# Patient Record
Sex: FEMALE | Race: BLACK OR AFRICAN AMERICAN | NOT HISPANIC OR LATINO | Employment: OTHER | URBAN - METROPOLITAN AREA
[De-identification: names, ages, dates, MRNs, and addresses within clinical notes are randomized per-mention and may not be internally consistent; named-entity substitution may affect disease eponyms.]

---

## 2023-07-13 ENCOUNTER — OFFICE VISIT (OUTPATIENT)
Dept: UROGYNECOLOGY | Facility: CLINIC | Age: 68
End: 2023-07-13
Payer: MEDICARE

## 2023-07-13 VITALS — SYSTOLIC BLOOD PRESSURE: 144 MMHG | WEIGHT: 192 LBS | DIASTOLIC BLOOD PRESSURE: 86 MMHG | HEART RATE: 67 BPM

## 2023-07-13 DIAGNOSIS — N99.3 COMPLETE PROLAPSE OF VAGINAL VAULT: ICD-10-CM

## 2023-07-13 DIAGNOSIS — R35.0 URINARY FREQUENCY: ICD-10-CM

## 2023-07-13 DIAGNOSIS — N95.2 VAGINAL ATROPHY: ICD-10-CM

## 2023-07-13 DIAGNOSIS — R33.9 INCOMPLETE BLADDER EMPTYING: Primary | ICD-10-CM

## 2023-07-13 PROCEDURE — 99205 OFFICE O/P NEW HI 60 MIN: CPT | Mod: 25,S$GLB,, | Performed by: OBSTETRICS & GYNECOLOGY

## 2023-07-13 PROCEDURE — 3079F DIAST BP 80-89 MM HG: CPT | Mod: CPTII,S$GLB,, | Performed by: OBSTETRICS & GYNECOLOGY

## 2023-07-13 PROCEDURE — 3079F PR MOST RECENT DIASTOLIC BLOOD PRESSURE 80-89 MM HG: ICD-10-PCS | Mod: CPTII,S$GLB,, | Performed by: OBSTETRICS & GYNECOLOGY

## 2023-07-13 PROCEDURE — 99205 PR OFFICE/OUTPT VISIT, NEW, LEVL V, 60-74 MIN: ICD-10-PCS | Mod: 25,S$GLB,, | Performed by: OBSTETRICS & GYNECOLOGY

## 2023-07-13 PROCEDURE — 51701 INSERT BLADDER CATHETER: CPT | Mod: S$GLB,,, | Performed by: OBSTETRICS & GYNECOLOGY

## 2023-07-13 PROCEDURE — 1159F MED LIST DOCD IN RCRD: CPT | Mod: CPTII,S$GLB,, | Performed by: OBSTETRICS & GYNECOLOGY

## 2023-07-13 PROCEDURE — 3077F SYST BP >= 140 MM HG: CPT | Mod: CPTII,S$GLB,, | Performed by: OBSTETRICS & GYNECOLOGY

## 2023-07-13 PROCEDURE — 51701 PR INSERTION OF NON-INDWELLING BLADDER CATHETERIZATION FOR RESIDUAL UR: ICD-10-PCS | Mod: S$GLB,,, | Performed by: OBSTETRICS & GYNECOLOGY

## 2023-07-13 PROCEDURE — 1159F PR MEDICATION LIST DOCUMENTED IN MEDICAL RECORD: ICD-10-PCS | Mod: CPTII,S$GLB,, | Performed by: OBSTETRICS & GYNECOLOGY

## 2023-07-13 PROCEDURE — 3077F PR MOST RECENT SYSTOLIC BLOOD PRESSURE >= 140 MM HG: ICD-10-PCS | Mod: CPTII,S$GLB,, | Performed by: OBSTETRICS & GYNECOLOGY

## 2023-07-13 RX ORDER — ESOMEPRAZOLE MAGNESIUM 40 MG/1
40 CAPSULE, DELAYED RELEASE ORAL
Status: ON HOLD | COMMUNITY
End: 2023-12-22 | Stop reason: HOSPADM

## 2023-07-13 RX ORDER — MELOXICAM 7.5 MG/1
TABLET ORAL
COMMUNITY
Start: 2022-09-22

## 2023-07-13 RX ORDER — POTASSIUM CHLORIDE 750 MG/1
CAPSULE, EXTENDED RELEASE ORAL
COMMUNITY
Start: 2022-09-22

## 2023-07-13 RX ORDER — BENZTROPINE MESYLATE 1 MG/1
TABLET ORAL
Status: ON HOLD | COMMUNITY
Start: 2022-09-22 | End: 2023-12-22 | Stop reason: HOSPADM

## 2023-07-13 RX ORDER — HALOPERIDOL 5 MG/1
TABLET ORAL
COMMUNITY
Start: 2022-09-22

## 2023-07-13 RX ORDER — METOPROLOL TARTRATE 50 MG/1
TABLET ORAL
COMMUNITY
Start: 2022-09-22

## 2023-07-13 RX ORDER — MONTELUKAST SODIUM 10 MG/1
TABLET ORAL
COMMUNITY
Start: 2022-09-22

## 2023-07-14 NOTE — PROGRESS NOTES
"PELVIC MEDICINE AND RECONSTRUCTIVE SURGERY CONSULT NOTE    CHIEF COMPLAINT:  Consultation requested by  regarding pelvic organ prolapse    HISTORY OF PRESENT ILLNESS:   Shavon Hahn is a 68 y.o. female  Patient reports first noticing symptoms 2022.    Her symptoms consist of vaginal bulge.    She reports pelvic pressure, vaginal bulge, or vaginal discomfort.    She denies vaginal bleeding, vaginal discharge, dysuria, hematuria.  She reports urinary frequency, urgency  Patient also denies abdominal or pelvic pain.      She states she first noticed the bulge after the birth of her last baby in 1981.  She had a vaginal hysterectomy soon afterward.  For the past 2 years it has "really been out."    Patient presents with symptoms of a vaginal bulge for 2022.  Size of the bulge:  golf ball  Bulge becoming progressively worse over time: Yes:    Bulge limits physical activity: Yes:    Tried a pessary: Yes:    Tried Kegels: No  Prior Surgery for prolapse: No  Prior Surgery for incontinence: No  Splinting to void/BM: No    Voids during the day: q1-2h  Voids at nighttime: 2  Leakage of urine with coughing or exercise: Yes  Leakage of urine with urgency: Yes  Pad for leakage of urine: Yes   -how often do you change your pad? 2times  Sensation of incomplete emptying: No  Stream intermittent  Hematuria Denies  Kidney stones Denies    Glasses/ounces of water in 1 day: 6 bottles  Cups of coffee/tea/soda in 1 day: 2 coffee  Bowel movements in 1 week: 5  Constipation/straining: Yes  Fecal incontinence:  No  Fecal urgency:  No  Fecal smearing:  No    > 3 urinary infections in the past year: No    Sexually active: No  Pain with sex: N/A  Vaginal dryness: Yes  Loss of urine or stool with sexual activity: N/A    She denies vaginal bleeding, vaginal discharge, dysuria, hematuria,   Patient also denies abdominal or pelvic pain.    Patient denies back injury or back pain  Patient denies lower extremity numbness, tingling      Gyn " Hx:  Patient reports h/o Normal paps; denies h/o Fibroids, denies h/o Endometriosis, denies h/o Ovarian Cysts, denies h/o abnormal paps, denies h/o LEEP/Cryo  Menopause age: h/o hyst   No LMP recorded.   V5  Obstetric complications? Yes  Vaginal births? yes  -use of forceps or vacuum? no     OB History    Para Term  AB Living   5 4 4 0 1 4   SAB IAB Ectopic Multiple Live Births   1 0 0 0 4      # Outcome Date GA Lbr Kin/2nd Weight Sex Delivery Anes PTL Lv   5 SAB    4.082 kg (9 lb) M    FD   4 Term    3.459 kg (7 lb 10 oz) M Vag-Spont   CIELO   3 Term    3.714 kg (8 lb 3 oz) M Vag-Spont   CIELO   2 Term    3.629 kg (8 lb) M Vag-Spont   CIELO   1 Term    2.948 kg (6 lb 8 oz) F Vag-Spont   CIELO        Review of patient's allergies indicates:   Allergen Reactions    Codeine     Esomeprazole magnesium           Current Outpatient Medications:     atorvastatin calcium (ATORVASTATIN ORAL),  0 Refill(s), Start Date: 22 11:49:00 CDT, Disp: , Rfl:     benztropine (COGENTIN) 1 MG tablet,  0 Refill(s), Start Date: 22 11:51:00 CDT, Disp: , Rfl:     esomeprazole (NEXIUM) 40 MG capsule, Take 40 mg by mouth before breakfast., Disp: , Rfl:     haloperidoL (HALDOL) 5 MG tablet,  0 Refill(s), Start Date: 22 11:51:00 CDT, Disp: , Rfl:     meloxicam (MOBIC) 7.5 MG tablet,  0 Refill(s), Start Date: 22 11:51:00 CDT, Disp: , Rfl:     metoprolol tartrate (LOPRESSOR) 50 MG tablet,  0 Refill(s), Start Date: 22 11:50:00 CDT, Disp: , Rfl:     montelukast (SINGULAIR) 10 mg tablet,  0 Refill(s), Start Date: 22 11:51:00 CDT, Disp: , Rfl:     potassium chloride (MICRO-K) 10 MEQ CpSR,  0 Refill(s), Start Date: 22 11:50:00 CDT, Disp: , Rfl:     estradioL (ESTRACE) 0.01 % (0.1 mg/gram) vaginal cream, Use a pea sized amount to the vagina once a night for 14 nights when initiating the medication, then 2-3 times per week.  DO NOT use the applicator, Disp: 42.5 g, Rfl:  10    History reviewed. No pertinent past medical history.    Past Surgical History:   Procedure Laterality Date    THROMBECTOMY  2020    VAGINAL HYSTERECTOMY  1981       History reviewed. No pertinent family history.     Social History     Tobacco Use    Smoking status: Never    Smokeless tobacco: Never   Substance Use Topics    Alcohol use: Never    Drug use: Never        Review of Systems      Psychological ROS: negative  Eyes: Trouble seeing near or far  ENT ROS: Painful or difficulty swallowing  Neuro ROS: Difficulty speaking and Unsteady gait  Respiratory ROS: Shortness of breath  Gastrointestinal ROS: Negative  Cardiovascular ROS: Difficulty breathing at night  Genitourinary ROS: Frequent urination, Leaking of urine, and Waking to urinate  Integumentary ROS: Negative  Musculoskeletal ROS: Negative    Physical Exam:   BP (!) 144/86   Pulse 67   Wt 87.1 kg (192 lb)   General: No acute distress   Skin: no lesions  Musculoskeletal: normal lower extremity strength  Sensation to light touch in the lower extremities is normal   Extremities: well perfused with normal pulses in the distal extremities, no peripheral edema noted  Abdominal exam: soft non tender, no palpable masses  External genitalia: normal female genitalia, no lesions, normal female hair distribution, no clitoral enlargement, nontender, no scars  The perineal reflexes are present  Exam Chaperoned by: Henrietta Rizo MA  Vagina/cervix: atrophic vagina, no discharge, exudate, lesion, or erythema  Bimanual exam: Surgically absent, no palpable masses, non-tender exam  Urethra: no prolapse, caruncle absent  Rectal: Medium tone, Poor squeeze, no palpable masses, No stool in the vault, No blood on the glove  Stress test: Negative  Pelvic strength 0/5  Post void residual volume: 320 mL via straight cath    POPQ (Date 2023): Aa +3 Ba +8.5  C +8.5  GH 5.5 PB 3 TVL 9 Ap +2 Bp +3 D N/A        ASSESSMENT:  Shavon Hahn is a 68 y.o.    1. Incomplete  bladder emptying    2. Urinary frequency    3. Complete prolapse of vaginal vault    4. Vaginal atrophy          PLAN:  The pathophysiology of the above condition has been discussed with the patient who expressed understanding.   We discussed the differences between stress and urge incontinence.    Pelvic Organ Prolapse - The patient was counseled regarding the pathophysiology of Prolapse. The patient was counseled regarding the possible natural progression of prolapse and the clinical consequences of worsening prolapse. We discussed that in most cases prolapse is not harmful but can negatively affect your quality of life.  She was counseled regarding management strategies including expectant management, pelvic floor physical therapy, Kegel's, avoidance of constipation and heavy lifting and pessary placement. Surgical management with and without hysterectomy also reviewed. Cure from surgical intervention is not guaranteed and there is risk of occult stress incontinence. She verbalized understanding and desires to consider her decision.  She is deciding between a pessary and surgery.     Incomplete bladder emptying - patient with 320 cc PVR.  Urethral kinking secondary to prolapse.  Will order renal U/S.  Patient will decide between surgery and pessary placement to allow for proper drainage of the bladder.    Orders Placed This Encounter    Urine culture    US Retroperitoneal Complete         Follow up 2 weeks with phone visit.    PATRICK Gallardo MD  Pelvic Medicine and Reconstructive Surgery  Urogynecology  [facility]    60 minutes was spent face to face with patient >50% of the time was spent in counseling and coordination of care.

## 2023-07-18 DIAGNOSIS — N95.8 GENITOURINARY SYNDROME OF MENOPAUSE: Primary | ICD-10-CM

## 2023-07-18 RX ORDER — ESTRADIOL 0.1 MG/G
CREAM VAGINAL
Qty: 42.5 G | Refills: 10 | Status: SHIPPED | OUTPATIENT
Start: 2023-07-18 | End: 2024-02-16 | Stop reason: SDUPTHER

## 2023-07-26 ENCOUNTER — HOSPITAL ENCOUNTER (OUTPATIENT)
Dept: RADIOLOGY | Facility: HOSPITAL | Age: 68
Discharge: HOME | End: 2023-07-26
Attending: OBSTETRICS & GYNECOLOGY
Payer: MEDICARE

## 2023-07-26 DIAGNOSIS — R33.9 INCOMPLETE BLADDER EMPTYING: ICD-10-CM

## 2023-07-26 PROCEDURE — 76770 US EXAM ABDO BACK WALL COMP: CPT | Mod: TC

## 2023-08-04 ENCOUNTER — OFFICE VISIT (OUTPATIENT)
Dept: UROGYNECOLOGY | Facility: CLINIC | Age: 68
End: 2023-08-04
Payer: MEDICARE

## 2023-08-04 DIAGNOSIS — N81.9 VAGINAL VAULT PROLAPSE: ICD-10-CM

## 2023-08-04 DIAGNOSIS — R35.0 URINARY FREQUENCY: ICD-10-CM

## 2023-08-04 DIAGNOSIS — N95.8 GENITOURINARY SYNDROME OF MENOPAUSE: Primary | ICD-10-CM

## 2023-08-04 DIAGNOSIS — R33.9 INCOMPLETE BLADDER EMPTYING: ICD-10-CM

## 2023-08-04 PROCEDURE — 99214 OFFICE O/P EST MOD 30 MIN: CPT | Mod: S$GLB,,, | Performed by: OBSTETRICS & GYNECOLOGY

## 2023-08-04 PROCEDURE — 99214 PR OFFICE/OUTPT VISIT, EST, LEVL IV, 30-39 MIN: ICD-10-PCS | Mod: S$GLB,,, | Performed by: OBSTETRICS & GYNECOLOGY

## 2023-08-04 NOTE — PROGRESS NOTES
"PELVIC MEDICINE AND RECONSTRUCTIVE SURGERY CONSULT NOTE    CHIEF COMPLAINT:  Follow up regarding POP    HISTORY OF PRESENT ILLNESS:   Shavon Hahn is a 68 y.o. female  Patient reports first noticing symptoms .    Her symptoms consist of vaginal bulge.    She reports pelvic pressure, vaginal bulge, or vaginal discomfort.    She denies vaginal bleeding, vaginal discharge, dysuria, hematuria.  She reports urinary frequency, urgency  Patient also denies abdominal or pelvic pain.      She states she first noticed the bulge after the birth of her last baby in .  She had a vaginal hysterectomy soon afterward.  For the past 2 years it has "really been out."    Patient presents with symptoms of a vaginal bulge for .  Size of the bulge:  golf ball  Bulge becoming progressively worse over time: Yes:    Bulge limits physical activity: Yes:    Tried a pessary: Yes:    Tried Kegels: No  Prior Surgery for prolapse: No  Prior Surgery for incontinence: No  Splinting to void/BM: No    Voids during the day: q1-2h  Voids at nighttime: 2  Leakage of urine with coughing or exercise: Yes  Leakage of urine with urgency: Yes  Pad for leakage of urine: Yes   -how often do you change your pad? 2 times  Sensation of incomplete emptying: No  Stream intermittent  Hematuria Denies  Kidney stones Denies    Interim Hx:  Patient presents in her normal state of health.  She would like to discuss surgical options at this time.    Gyn Hx:  Patient reports h/o Normal paps; denies h/o Fibroids, denies h/o Endometriosis, denies h/o Ovarian Cysts, denies h/o abnormal paps, denies h/o LEEP/Cryo  Menopause age: h/o hyst   No LMP recorded.   V5  Obstetric complications? Yes  Vaginal births? yes  -use of forceps or vacuum? no     OB History    Para Term  AB Living   5 4 4 0 1 4   SAB IAB Ectopic Multiple Live Births   1 0 0 0 4      # Outcome Date GA Lbr Kin/2nd Weight Sex Delivery Anes PTL Lv   5 SAB    4.082 kg (9 lb) M  "   FD   4 Term 1979   3.459 kg (7 lb 10 oz) M Vag-Spont   CIELO   3 Term 1978   3.714 kg (8 lb 3 oz) M Vag-Spont   CIELO   2 Term 1976   3.629 kg (8 lb) M Vag-Spont   CIELO   1 Term 1975   2.948 kg (6 lb 8 oz) F Vag-Spont   CIELO        Review of patient's allergies indicates:   Allergen Reactions    Codeine     Esomeprazole magnesium           Current Outpatient Medications:     atorvastatin calcium (ATORVASTATIN ORAL),  0 Refill(s), Start Date: 09/22/22 11:49:00 CDT, Disp: , Rfl:     benztropine (COGENTIN) 1 MG tablet,  0 Refill(s), Start Date: 09/22/22 11:51:00 CDT, Disp: , Rfl:     esomeprazole (NEXIUM) 40 MG capsule, Take 40 mg by mouth before breakfast., Disp: , Rfl:     estradioL (ESTRACE) 0.01 % (0.1 mg/gram) vaginal cream, Use a pea sized amount to the vagina once a night for 14 nights when initiating the medication, then 2-3 times per week.  DO NOT use the applicator, Disp: 42.5 g, Rfl: 10    haloperidoL (HALDOL) 5 MG tablet,  0 Refill(s), Start Date: 09/22/22 11:51:00 CDT, Disp: , Rfl:     meloxicam (MOBIC) 7.5 MG tablet,  0 Refill(s), Start Date: 09/22/22 11:51:00 CDT, Disp: , Rfl:     metoprolol tartrate (LOPRESSOR) 50 MG tablet,  0 Refill(s), Start Date: 09/22/22 11:50:00 CDT, Disp: , Rfl:     montelukast (SINGULAIR) 10 mg tablet,  0 Refill(s), Start Date: 09/22/22 11:51:00 CDT, Disp: , Rfl:     potassium chloride (MICRO-K) 10 MEQ CpSR,  0 Refill(s), Start Date: 09/22/22 11:50:00 CDT, Disp: , Rfl:     No past medical history on file.    Past Surgical History:   Procedure Laterality Date    THROMBECTOMY  2020    VAGINAL HYSTERECTOMY  1981       No family history on file.     Social History     Tobacco Use    Smoking status: Never    Smokeless tobacco: Never   Substance Use Topics    Alcohol use: Never    Drug use: Never        Review of Systems      Psychological ROS: negative  Eyes: Trouble seeing near or far  ENT ROS: Painful or difficulty swallowing  Neuro ROS: Difficulty speaking and Unsteady  gait  Respiratory ROS: Shortness of breath  Gastrointestinal ROS: Negative  Cardiovascular ROS: Difficulty breathing at night  Genitourinary ROS: Frequent urination, Leaking of urine, and Waking to urinate  Integumentary ROS: Negative  Musculoskeletal ROS: Negative    Physical Exam:   There were no vitals taken for this visit.  General: No acute distress   Skin: no lesions  Musculoskeletal: normal lower extremity strength  Sensation to light touch in the lower extremities is normal   Extremities: well perfused with normal pulses in the distal extremities, no peripheral edema noted  Abdominal exam: soft non tender, no palpable masses  External genitalia: normal female genitalia, no lesions, normal female hair distribution, no clitoral enlargement, nontender, no scars  The perineal reflexes are present  Exam Chaperoned by: Henrietta Rizo MA  Vagina/cervix: atrophic vagina, no discharge, exudate, lesion, or erythema  Bimanual exam: Surgically absent, no palpable masses, non-tender exam  Urethra: no prolapse, caruncle absent  Rectal: Medium tone, Poor squeeze, no palpable masses, No stool in the vault, No blood on the glove  Stress test: Negative  Pelvic strength 0/5  Post void residual volume: 320 mL via straight cath    POPQ (Date 2023): Aa +3 Ba +8.5  C +8.5  GH 5.5 PB 3 TVL 9 Ap +2 Bp +3 D N/A          ASSESSMENT:  Shavon Hahn is a 68 y.o.    1. Genitourinary syndrome of menopause    2. Incomplete bladder emptying    3. Urinary frequency    4. Vaginal vault prolapse         PLAN:  The pathophysiology of the above condition has been discussed with the patient who expressed understanding.   We discussed the differences between stress and urge incontinence.    Pelvic Organ Prolapse - The patient was counseled regarding the pathophysiology of Prolapse. The patient was counseled regarding the possible natural progression of prolapse and the clinical consequences of worsening prolapse. We discussed that in most  cases prolapse is not harmful but can negatively affect your quality of life.  She was counseled regarding management strategies including expectant management, pelvic floor physical therapy, Kegel's, avoidance of constipation and heavy lifting and pessary placement. Surgical management reviewed. Cure from surgical intervention is not guaranteed and there is risk of occult stress incontinence. Patient has baseline mixed incontinence, however, she did not demonstrate leak on previous exam and would need to for intervention.  Recommend SSLS, A/P, Bulking.  Patient will need UDT with prolapse reduced before surgery.    Patient counseled on the risks, benefits, and alternatives to surgery, including:  Bleeding, infection, damage to surrounding organs, including the bowel and bladder.  Patient counseled on the risk of injury to major vascular structures.  Patient counseled on the risk of conversion to open, and/or other procedures as necessary.  Patient consents to the use of blood products if it is to save her life.  Patient accepts these risks and wishes to proceed with surgery.    Incomplete bladder emptying - patient with 320 cc PVR.  Urethral kinking secondary to prolapse.  Will order renal U/S.  Patient will decide between surgery and pessary placement to allow for proper drainage of the bladder.  -Renal US normal.  Patient with cholelithiasis and a small left renal cyst.  No hydronephrosis.    Follow up with UDT    PATRICK Gallardo MD  Pelvic Medicine and Reconstructive Surgery  Urogynecology  [facility]    30 minutes was spent face to face with patient >50% of the time was spent in counseling and coordination of care.

## 2023-10-31 ENCOUNTER — OFFICE VISIT (OUTPATIENT)
Dept: UROGYNECOLOGY | Facility: CLINIC | Age: 68
End: 2023-10-31
Payer: MEDICARE

## 2023-10-31 DIAGNOSIS — R35.0 URINARY FREQUENCY: ICD-10-CM

## 2023-10-31 DIAGNOSIS — N81.9: ICD-10-CM

## 2023-10-31 DIAGNOSIS — N95.8 GENITOURINARY SYNDROME OF MENOPAUSE: ICD-10-CM

## 2023-10-31 DIAGNOSIS — R33.9 INCOMPLETE BLADDER EMPTYING: ICD-10-CM

## 2023-10-31 DIAGNOSIS — N99.3 COMPLETE PROLAPSE OF VAGINAL VAULT: Primary | ICD-10-CM

## 2023-10-31 DIAGNOSIS — N39.490: ICD-10-CM

## 2023-10-31 PROCEDURE — 99417 PROLNG OP E/M EACH 15 MIN: CPT | Mod: S$GLB,,, | Performed by: OBSTETRICS & GYNECOLOGY

## 2023-10-31 PROCEDURE — 51728 PR COMPLEX CYSTOMETROGRAM VOIDING PRESSURE STUDIES: ICD-10-PCS | Mod: 26,,, | Performed by: OBSTETRICS & GYNECOLOGY

## 2023-10-31 PROCEDURE — 51728 CYSTOMETROGRAM W/VP: CPT | Mod: 26,,, | Performed by: OBSTETRICS & GYNECOLOGY

## 2023-10-31 PROCEDURE — 99417 PR PROLONGED SVC, OUTPT, W/WO DIRECT PT CONTACT,  EA ADDTL 15 MIN: ICD-10-PCS | Mod: ,,, | Performed by: OBSTETRICS & GYNECOLOGY

## 2023-10-31 PROCEDURE — 99215 PR OFFICE/OUTPT VISIT, EST, LEVL V, 40-54 MIN: ICD-10-PCS | Mod: 25,,, | Performed by: OBSTETRICS & GYNECOLOGY

## 2023-10-31 PROCEDURE — 51784 ANAL/URINARY MUSCLE STUDY: CPT | Mod: 51,S$GLB,, | Performed by: OBSTETRICS & GYNECOLOGY

## 2023-10-31 PROCEDURE — 51741 ELECTRO-UROFLOWMETRY FIRST: CPT | Mod: 51,S$GLB,, | Performed by: OBSTETRICS & GYNECOLOGY

## 2023-10-31 PROCEDURE — 51784 PR ANAL/URINARY MUSCLE STUDY: ICD-10-PCS | Mod: 51,S$GLB,, | Performed by: OBSTETRICS & GYNECOLOGY

## 2023-10-31 PROCEDURE — 51741 PR UROFLOWMETRY, COMPLEX: ICD-10-PCS | Mod: 51,S$GLB,, | Performed by: OBSTETRICS & GYNECOLOGY

## 2023-10-31 PROCEDURE — 51797 PR VOIDING PRESS STUDY INTRA-ABDOMINAL VOID: ICD-10-PCS | Mod: S$GLB,,, | Performed by: OBSTETRICS & GYNECOLOGY

## 2023-10-31 PROCEDURE — 51797 INTRAABDOMINAL PRESSURE TEST: CPT | Mod: S$GLB,,, | Performed by: OBSTETRICS & GYNECOLOGY

## 2023-10-31 PROCEDURE — 99215 OFFICE O/P EST HI 40 MIN: CPT | Mod: 25,,, | Performed by: OBSTETRICS & GYNECOLOGY

## 2023-10-31 NOTE — PROGRESS NOTES
"PELVIC MEDICINE AND RECONSTRUCTIVE SURGERY CONSULT NOTE    CHIEF COMPLAINT:  Follow up regarding POP    HISTORY OF PRESENT ILLNESS:   Shavon Hahn is a 68 y.o. female  Patient reports first noticing symptoms .    Her symptoms consist of vaginal bulge.    She reports pelvic pressure, vaginal bulge, or vaginal discomfort.    She denies vaginal bleeding, vaginal discharge, dysuria, hematuria.  She reports urinary frequency, urgency  Patient also denies abdominal or pelvic pain.      She states she first noticed the bulge after the birth of her last baby in .  She had a vaginal hysterectomy soon afterward.  For the past 2 years it has "really been out."    Patient presents with symptoms of a vaginal bulge for .  Size of the bulge:  golf ball  Bulge becoming progressively worse over time: Yes:    Bulge limits physical activity: Yes:    Tried a pessary: Yes:    Tried Kegels: No  Prior Surgery for prolapse: No  Prior Surgery for incontinence: No  Splinting to void/BM: No    Voids during the day: q1-2h  Voids at nighttime: 2  Leakage of urine with coughing or exercise: Yes  Leakage of urine with urgency: Yes  Pad for leakage of urine: Yes   -how often do you change your pad? 2 times  Sensation of incomplete emptying: No  Stream intermittent  Hematuria Denies  Kidney stones Denies    Interim Hx:  Patient presents in her normal state of health.  She presents for urodynamic testing and would like some relief until surgery. She is open to doing a pessary fitting today.    Gyn Hx:  Patient reports h/o Normal paps; denies h/o Fibroids, denies h/o Endometriosis, denies h/o Ovarian Cysts, denies h/o abnormal paps, denies h/o LEEP/Cryo  Menopause age: h/o hyst   No LMP recorded.   V5  Obstetric complications? Yes  Vaginal births? yes  -use of forceps or vacuum? no     OB History    Para Term  AB Living   5 4 4 0 1 4   SAB IAB Ectopic Multiple Live Births   1 0 0 0 4      # Outcome Date GA Lbr " Kin/2nd Weight Sex Delivery Anes PTL Lv   5 SAB 1981   4.082 kg (9 lb) M    FD   4 Term 1979   3.459 kg (7 lb 10 oz) M Vag-Spont   CIELO   3 Term 1978   3.714 kg (8 lb 3 oz) M Vag-Spont   CIELO   2 Term 1976   3.629 kg (8 lb) M Vag-Spont   CIELO   1 Term 1975   2.948 kg (6 lb 8 oz) F Vag-Spont   CIELO        Review of patient's allergies indicates:   Allergen Reactions    Codeine     Esomeprazole magnesium           Current Outpatient Medications:     atorvastatin calcium (ATORVASTATIN ORAL),  0 Refill(s), Start Date: 09/22/22 11:49:00 CDT, Disp: , Rfl:     benztropine (COGENTIN) 1 MG tablet,  0 Refill(s), Start Date: 09/22/22 11:51:00 CDT, Disp: , Rfl:     esomeprazole (NEXIUM) 40 MG capsule, Take 40 mg by mouth before breakfast., Disp: , Rfl:     estradioL (ESTRACE) 0.01 % (0.1 mg/gram) vaginal cream, Use a pea sized amount to the vagina once a night for 14 nights when initiating the medication, then 2-3 times per week.  DO NOT use the applicator, Disp: 42.5 g, Rfl: 10    haloperidoL (HALDOL) 5 MG tablet,  0 Refill(s), Start Date: 09/22/22 11:51:00 CDT, Disp: , Rfl:     meloxicam (MOBIC) 7.5 MG tablet,  0 Refill(s), Start Date: 09/22/22 11:51:00 CDT, Disp: , Rfl:     metoprolol tartrate (LOPRESSOR) 50 MG tablet,  0 Refill(s), Start Date: 09/22/22 11:50:00 CDT, Disp: , Rfl:     montelukast (SINGULAIR) 10 mg tablet,  0 Refill(s), Start Date: 09/22/22 11:51:00 CDT, Disp: , Rfl:     potassium chloride (MICRO-K) 10 MEQ CpSR,  0 Refill(s), Start Date: 09/22/22 11:50:00 CDT, Disp: , Rfl:     Past Medical History:   Diagnosis Date    Essential (primary) hypertension     GERD without esophagitis     Hypercholesterolemia     Schizoaffective disorder        Past Surgical History:   Procedure Laterality Date    THROMBECTOMY  2020    VAGINAL HYSTERECTOMY  1981       No family history on file.     Social History     Tobacco Use    Smoking status: Never    Smokeless tobacco: Never   Substance Use Topics    Alcohol use: Never    Drug  use: Never        Review of Systems      Psychological ROS: negative  Eyes: Trouble seeing near or far  ENT ROS: Painful or difficulty swallowing  Neuro ROS: Difficulty speaking and Unsteady gait  Respiratory ROS: Shortness of breath  Gastrointestinal ROS: Negative  Cardiovascular ROS: Difficulty breathing at night  Genitourinary ROS: Frequent urination, Leaking of urine, and Waking to urinate  Integumentary ROS: Negative  Musculoskeletal ROS: Negative    Physical Exam:   There were no vitals taken for this visit.  General: No acute distress   Skin: no lesions  Musculoskeletal: normal lower extremity strength  Sensation to light touch in the lower extremities is normal   Extremities: well perfused with normal pulses in the distal extremities, no peripheral edema noted  Abdominal exam: soft non tender, no palpable masses  External genitalia: normal female genitalia, no lesions, normal female hair distribution, no clitoral enlargement, nontender, no scars  The perineal reflexes are present  Exam Chaperoned by: Henrietta Rizo MA  Vagina/cervix: atrophic vagina, no discharge, exudate, lesion, or erythema, laceration in the middle of the anterior vaginal wall epithelium (cracked) from pressure  Bimanual exam: Surgically absent, no palpable masses, non-tender exam  Urethra: no prolapse, caruncle absent  Rectal: Medium tone, Poor squeeze, no palpable masses, No stool in the vault, No blood on the glove  Stress test: Negative  Pelvic strength 0/5  Post void residual volume: 320 mL via straight cath    POPQ (Date 2023): Aa +3 Ba +8.5  C +8.5  GH 5.5 PB 3 TVL 9 Ap +2 Bp +3 D N/A      Pessary fitting:  Tried cube, Flexishelf, Gellhorn, Shaatz, and double pessary. All came out.  Will try to move up surgery at the [facility] from 24 to a sooner date    ASSESSMENT:  Shavon Hahn is a 68 y.o.    1. Complete prolapse of vaginal vault    2. Incomplete bladder emptying    3. Urinary frequency    4. Overflow incontinence of  urine due to prolapse of female genital organ    5. Genitourinary syndrome of menopause           PLAN:  The pathophysiology of the above condition has been discussed with the patient who expressed understanding.   We discussed the differences between stress and urge incontinence.    Pelvic Organ Prolapse - The patient was counseled regarding the pathophysiology of Prolapse. The patient was counseled regarding the possible natural progression of prolapse and the clinical consequences of worsening prolapse. We discussed that in most cases prolapse is not harmful but can negatively affect your quality of life.  She was counseled regarding management strategies including expectant management, pelvic floor physical therapy, Kegel's, avoidance of constipation and heavy lifting and pessary placement. Surgical management reviewed. Cure from surgical intervention is not guaranteed and there is risk of occult stress incontinence. Patient has baseline mixed incontinence, however, she did not demonstrate leak on previous exam and would need to for intervention.  Recommend SSLS, A/P, Bulking.  Patient will need UDT with prolapse reduced before surgery.  -Pessary fitting done today, all pessaries fell out  -Patient scheduled for SSLS, A/P, poss Bulking on 11/21/2023    Overflow incontinence - No leak on testing today.  Incontinence likely represents overflow. Increased bladder capacity and decreased sensation. Advised timed voids. Will bulk if needed (i.e. if she leaks once the prolapse is reduced). Cystoscopy possible.    Incomplete bladder emptying - patient with 320 cc PVR.  Urethral kinking secondary to prolapse.  Will order renal U/S.  Patient will decide between surgery and pessary placement to allow for proper drainage of the bladder.  -Renal US normal.  Patient with cholelithiasis and a small left renal cyst.  No hydronephrosis.    Genitourinary syndrome of menopause/Vaginal atrophy - due to a hypoestrogenic state in the  vagina. Though it is most common in postmenopausal women (up to 50-70%  of postmenopausal women being symptomatic at least to some degree), it can affect up to 15% of premenopausal women was well.  GSM is a spectrum of changes such as vaginal dryness, dyspareunia, irritation, burning, itching, dysuria, urgency, stress/urgency incontinence, recurrent UTIs, urethral prolapse, decreased elasticity, and ischemia of the vesical trigone. If unable to use hormonal treatments, patient can use vaginal moisturizers such as: Replens, Revaree, or Womaness Davily V Soothe. However, if there is no contraindication to use, vaginal estrogen is the best treatment for GSM. Encouraged use of vaginal estrogen as it will restore some elasticity to the vagina and has been shown to decrease the rate of recurrent UTIs, decrease vaginal pH, and increase vaginal lactobacillus.  A pea-sized amount to the vagina every night for 14 days, then 2-3 times per week.  Handout given on low dose vaginal estrogen.    Follow up with skylar Gallardo MD  Pelvic Medicine and Reconstructive Surgery  Urogynecology  [facility]    90 minutes was spent face to face with patient >50% of the time was spent in counseling and coordination of care.

## 2023-11-08 NOTE — PROCEDURES
URODYNAMICS REPORT    Multichannel Urodynamic Testing:       Indication: Mixed urinary incontinence       Uroflow:  Patient voided before she came to testing  Cath PVR (ml): 350 mL         Cystometry:  Prolapse stage: 4  POP reduced: Yes  Fill rate (ml/min): 50  First sensation (ml): 100  First urge (ml): 256  Strong urge (ml): 482  Cystometric capacity (ml): 629  Detrusor contraction Neg  Detrusor contraction leak: Neg Suppressible? N/A  Urodynamic stress incontinence (pos/neg/ml): Neg  Potential stress incontinence (pos/neg/ml): Positive       Leak Point Pressure:  Bladder volume (ml): No leak  Patient position (supine/sitting/standing): Sitting  Valsalva LPP (pos/neg/cmH2O): Neg  Cough LPP (pos/neg/cmH2O): Neg       Pressure Flow Study:  Voided volume (ml): 725.3  Max Flow Rate (ml/s): 37.1  Detrusor void (yes/no): Yes  Pattern: Normal (bell-shaped curve)  Pdet at Peak Flow (cmH2O): 135.3  PVR (ml): 10 mL     Impression:  - Stable bladder, no detrusor overactivity  - Decreased sensation  - Increased capacity (> 300mL), Normal to increased capacity -> 735 mL  - No evidence of urinary stress incontinence or ISD (range < 57irF8V)  - No evidence of voiding dysfunction  - Normal flow pattern     Overflow incontinence is the most likely diagnosis based on multichannel urodynamics:  Patient is able to hold a large amount of urine in her bladder.  Most likely she is leaking when she reaches the upper limits of her storage capacity. Recommend timed voids q3 hours for avoid overfilling.    Patient is not candidate for anti-incontinence procedure.  Results d/w patient briefly.  She will need further counseling on her results and her options.  Discussed that urodynamic testing is not 100% accurate and that occult CATRACHITA still may develop after surgery.  If this happens, we can offer her therapy for incontinence in a staged fashion.    PATRICK Gallardo MD  Pelvic Medicine and Reconstructive Surgery  Urogynecology  [facility]  [place]

## 2023-11-18 ENCOUNTER — HOSPITAL ENCOUNTER (OUTPATIENT)
Dept: TELEMEDICINE | Facility: HOSPITAL | Age: 68
Discharge: HOME | End: 2023-11-18
Payer: MEDICARE

## 2023-11-18 DIAGNOSIS — R47.1 DYSARTHRIA AND ANARTHRIA: Primary | ICD-10-CM

## 2023-11-18 PROCEDURE — 99448 NTRPROF PH1/NTRNET/EHR 21-30: CPT | Mod: ,,, | Performed by: PSYCHIATRY & NEUROLOGY

## 2023-11-18 PROCEDURE — 99448 PR INTERPROF, PHONE/INTERNET/EHR, CONSULT, 21-30 MINS: ICD-10-PCS | Mod: ,,, | Performed by: PSYCHIATRY & NEUROLOGY

## 2023-11-19 NOTE — TELEMEDICINE CONSULT
[facility] [address]  Vascular Neurology  Com[facility] St[facility]  TeleVascular Neurology Interprofessional Consult Note           Consulting Provider: ANDRES HUFFMAN   Patient Location: [facility]     Summary of patient's symptoms:  Moderate dysarthria     Images personally reviewed and interpreted:  Study: not shared (negative per ED physician)    Assessment and plan:  No contraindications to TNK - recommend treatment with thrombolysis followed by CTA head/neck.     I spent approximately 25 minutes on this encounter. More than half of that time was spent communicating with the consulting provider and coordinating patient care.     Signature  April Kaplan MD        This encounter was conducted as an interprofessional communication between providers at the spoke and vascular neurologist. The interaction was completed over the phone or via secure messaging (electronic medical record - [employer] Secure Chat).     Once this note was completed, a written copy was sent back to the provider via fax or electronic medical record.

## 2023-12-12 ENCOUNTER — OFFICE VISIT (OUTPATIENT)
Dept: UROGYNECOLOGY | Facility: CLINIC | Age: 68
End: 2023-12-12
Payer: MEDICARE

## 2023-12-12 VITALS — WEIGHT: 192 LBS | DIASTOLIC BLOOD PRESSURE: 84 MMHG | HEART RATE: 78 BPM | SYSTOLIC BLOOD PRESSURE: 125 MMHG

## 2023-12-12 DIAGNOSIS — R33.9 INCOMPLETE BLADDER EMPTYING: ICD-10-CM

## 2023-12-12 DIAGNOSIS — N99.3 COMPLETE PROLAPSE OF VAGINAL VAULT: Primary | ICD-10-CM

## 2023-12-12 DIAGNOSIS — R35.0 URINARY FREQUENCY: ICD-10-CM

## 2023-12-12 DIAGNOSIS — N95.8 GENITOURINARY SYNDROME OF MENOPAUSE: ICD-10-CM

## 2023-12-12 DIAGNOSIS — N81.9: ICD-10-CM

## 2023-12-12 DIAGNOSIS — N39.490: ICD-10-CM

## 2023-12-12 DIAGNOSIS — Z01.818 PRE-OP EXAM: ICD-10-CM

## 2023-12-12 PROCEDURE — 3074F PR MOST RECENT SYSTOLIC BLOOD PRESSURE < 130 MM HG: ICD-10-PCS | Mod: CPTII,S$GLB,, | Performed by: OBSTETRICS & GYNECOLOGY

## 2023-12-12 PROCEDURE — 1159F MED LIST DOCD IN RCRD: CPT | Mod: CPTII,S$GLB,, | Performed by: OBSTETRICS & GYNECOLOGY

## 2023-12-12 PROCEDURE — 52000 CYSTOURETHROSCOPY: CPT | Mod: S$GLB,,, | Performed by: OBSTETRICS & GYNECOLOGY

## 2023-12-12 PROCEDURE — 3074F SYST BP LT 130 MM HG: CPT | Mod: CPTII,S$GLB,, | Performed by: OBSTETRICS & GYNECOLOGY

## 2023-12-12 PROCEDURE — 1159F PR MEDICATION LIST DOCUMENTED IN MEDICAL RECORD: ICD-10-PCS | Mod: CPTII,S$GLB,, | Performed by: OBSTETRICS & GYNECOLOGY

## 2023-12-12 PROCEDURE — 52000 PR CYSTOURETHROSCOPY: ICD-10-PCS | Mod: S$GLB,,, | Performed by: OBSTETRICS & GYNECOLOGY

## 2023-12-12 PROCEDURE — 3079F PR MOST RECENT DIASTOLIC BLOOD PRESSURE 80-89 MM HG: ICD-10-PCS | Mod: CPTII,S$GLB,, | Performed by: OBSTETRICS & GYNECOLOGY

## 2023-12-12 PROCEDURE — 3079F DIAST BP 80-89 MM HG: CPT | Mod: CPTII,S$GLB,, | Performed by: OBSTETRICS & GYNECOLOGY

## 2023-12-12 PROCEDURE — 99215 PR OFFICE/OUTPT VISIT, EST, LEVL V, 40-54 MIN: ICD-10-PCS | Mod: 25,S$GLB,, | Performed by: OBSTETRICS & GYNECOLOGY

## 2023-12-12 PROCEDURE — 3044F HG A1C LEVEL LT 7.0%: CPT | Mod: CPTII,S$GLB,, | Performed by: OBSTETRICS & GYNECOLOGY

## 2023-12-12 PROCEDURE — 3044F PR MOST RECENT HEMOGLOBIN A1C LEVEL <7.0%: ICD-10-PCS | Mod: CPTII,S$GLB,, | Performed by: OBSTETRICS & GYNECOLOGY

## 2023-12-12 PROCEDURE — 99215 OFFICE O/P EST HI 40 MIN: CPT | Mod: 25,S$GLB,, | Performed by: OBSTETRICS & GYNECOLOGY

## 2023-12-12 RX ORDER — PHENAZOPYRIDINE HYDROCHLORIDE 200 MG/1
200 TABLET, FILM COATED ORAL ONCE
Status: CANCELLED | OUTPATIENT
Start: 2023-12-12 | End: 2023-12-12

## 2023-12-12 RX ORDER — GABAPENTIN 300 MG/1
300 CAPSULE ORAL ONCE
Status: CANCELLED | OUTPATIENT
Start: 2023-12-12 | End: 2023-12-12

## 2023-12-12 RX ORDER — ACETAMINOPHEN 500 MG
1000 TABLET ORAL ONCE
Status: CANCELLED | OUTPATIENT
Start: 2023-12-12 | End: 2023-12-12

## 2023-12-12 RX ORDER — CELECOXIB 200 MG/1
400 CAPSULE ORAL ONCE
Status: CANCELLED | OUTPATIENT
Start: 2023-12-12 | End: 2023-12-12

## 2023-12-12 RX ORDER — CEFAZOLIN SODIUM 2 G/50ML
2 SOLUTION INTRAVENOUS ONCE
Status: CANCELLED | OUTPATIENT
Start: 2023-12-12 | End: 2023-12-12

## 2023-12-12 RX ORDER — ONDANSETRON 4 MG/1
4 TABLET, FILM COATED ORAL ONCE
Status: CANCELLED | OUTPATIENT
Start: 2023-12-12 | End: 2023-12-12

## 2023-12-12 NOTE — PROCEDURES
Cystoscopy     Brief Procedure Note  Name:  Shavon Hahn  MRN: [id number]  Age: 68 y.o.   YOB: 1955  Gender: female      Procedure Date:  12/12/2023      Location:  Clinic     Pre Procedure Diagnosis:  1. Complete prolapse of vaginal vault    2. Incomplete bladder emptying    3. Urinary frequency    4. Overflow incontinence of urine due to prolapse of female genital organ    5. Genitourinary syndrome of menopause         Post-Op Diagnosis:  same    Procedure:  Flexible cystoscopy    Surgeon:   Cordelia Gallardo MD    Anesthesia Type:  2% Lidocaine jelly (Urojet)    Findings:  Ureteral orifices in normal orthotopic position, no trabeculations, no tumors, no mucosal lesions, small diverticulum seen 3 mm in the posterior wall of the bladder. Petechiae noted at the trigone and the base of the bladder. Squamous metaplasia at the trigone.  Normal urethra.    Estimated Blood Loss:  0 ml    Fluids:  Normal saline    Complications: None    Condition:  stable    Disposition:  home    Procedure in Detail::  Patient was placed on table in dorsal lithotomy position and prepped and draped in normal sterile fashion.  A well lubricated 16 Nauruan flexible cystoscope was inserted into urethral meatus and advanced into the bladder.   The bladder was then partially filled and evaluated in a pandescopic fashion. The bilateral ureteral orifices were in normal orthotopic position. There were no mucosal lesions, trabeculations, stones, or tumors. A small diverticulum was seen (3 mm) in the posterior wall of the bladder. Petechiae noted at the trigone and the base of the bladder. Squamous metaplasia at the trigone.  Normal urethra. Overall the bladder appeared normal. Retroflexed view showed normal urothelium at the bladder neck.  Care was taken to keep the lumen in the center of view.  In the urethra there were no strictures, mucosal lesions, tumors, or polyps. The bladder was then drained and the results briefly discussed with  patient.  All parts of the cystoscopic sheath and instruments were intact removed from the patient.  Patient tolerated procedure well.  All instrument, and sponge counts were correct.  Patient was allowed to recover in the room.    PATRICK Gallardo MD  Pelvic Medicine and Reconstructive Surgery  Urogynecology      Cystoscopy    Date/Time: 12/12/2023 3:40 PM    Performed by: Cordelia Gallardo MD  Authorized by: Cordelia Gallardo MD    Consent Done?:  Yes (Verbal)  Timeout: prior to procedure the correct patient, procedure, and site was verified    Prep: patient was prepped and draped in usual sterile fashion    Local anesthesia used?: Yes    Anesthesia:  Local infiltration  Local anesthetic:  Topical anesthetic  Anesthetic total (ml):  5  Indications: incontinence    Position:  Dorsal lithotomy  Anesthesia:  Local infiltration  Patient sedated?: No    Preparation: Patient was prepped and draped in usual sterile fashion    Scope type:  Flexible cystoscope  External exam normal: Yes    Urethra normal: Yes    Bladder neck normal: Yes    Bladder normal: Yes (small bladder diverticulum 3 mm, petechiae noted at the trigone)     patient tolerated the procedure well with no immediate complications

## 2023-12-12 NOTE — PROGRESS NOTES
"PELVIC MEDICINE AND RECONSTRUCTIVE SURGERY NOTE    CHIEF COMPLAINT:  Follow up regarding POP    HISTORY OF PRESENT ILLNESS:   Shavon Hahn is a 68 y.o. female  Patient reports first noticing symptoms .    Her symptoms consist of vaginal bulge.    She reports pelvic pressure, vaginal bulge, or vaginal discomfort.    She denies vaginal bleeding, vaginal discharge, dysuria, hematuria.  She reports urinary frequency, urgency  Patient also denies abdominal or pelvic pain.      She states she first noticed the bulge after the birth of her last baby in .  She had a vaginal hysterectomy soon afterward.  For the past 2 years it has "really been out."    Patient presents with symptoms of a vaginal bulge for .  Size of the bulge:  golf ball  Bulge becoming progressively worse over time: Yes:    Bulge limits physical activity: Yes:    Tried a pessary: Yes:    Tried Kegels: No  Prior Surgery for prolapse: No  Prior Surgery for incontinence: No  Splinting to void/BM: No    Voids during the day: q1-2h  Voids at nighttime: 2  Leakage of urine with coughing or exercise: Yes  Leakage of urine with urgency: Yes  Pad for leakage of urine: Yes   -how often do you change your pad? 2 times  Sensation of incomplete emptying: No  Stream intermittent  Hematuria Denies  Kidney stones Denies    Interim Hx:  Patient presents in her normal state of health.  She presents for cystoscopy and consents today.  She is scheduled for surgery on 2023    Gyn Hx:  Patient reports h/o Normal paps; denies h/o Fibroids, denies h/o Endometriosis, denies h/o Ovarian Cysts, denies h/o abnormal paps, denies h/o LEEP/Cryo  Menopause age: h/o hyst   No LMP recorded.   V5  Obstetric complications? Yes  Vaginal births? yes  -use of forceps or vacuum? no     OB History    Para Term  AB Living   5 4 4 0 1 4   SAB IAB Ectopic Multiple Live Births   1 0 0 0 4      # Outcome Date GA Lbr Kin/2nd Weight Sex Delivery Anes PTL Lv   5 " SAB 1981   4.082 kg (9 lb) M    FD   4 Term 1979   3.459 kg (7 lb 10 oz) M Vag-Spont   CIELO   3 Term 1978   3.714 kg (8 lb 3 oz) M Vag-Spont   CIELO   2 Term 1976   3.629 kg (8 lb) M Vag-Spont   CIELO   1 Term 1975   2.948 kg (6 lb 8 oz) F Vag-Spont   CIELO        Review of patient's allergies indicates:   Allergen Reactions    Codeine     Esomeprazole magnesium           Current Outpatient Medications:     atorvastatin calcium (ATORVASTATIN ORAL),  0 Refill(s), Start Date: 09/22/22 11:49:00 CDT, Disp: , Rfl:     benztropine (COGENTIN) 1 MG tablet,  0 Refill(s), Start Date: 09/22/22 11:51:00 CDT, Disp: , Rfl:     esomeprazole (NEXIUM) 40 MG capsule, Take 40 mg by mouth before breakfast., Disp: , Rfl:     estradioL (ESTRACE) 0.01 % (0.1 mg/gram) vaginal cream, Use a pea sized amount to the vagina once a night for 14 nights when initiating the medication, then 2-3 times per week.  DO NOT use the applicator, Disp: 42.5 g, Rfl: 10    haloperidoL (HALDOL) 5 MG tablet,  0 Refill(s), Start Date: 09/22/22 11:51:00 CDT, Disp: , Rfl:     meloxicam (MOBIC) 7.5 MG tablet,  0 Refill(s), Start Date: 09/22/22 11:51:00 CDT, Disp: , Rfl:     metoprolol tartrate (LOPRESSOR) 50 MG tablet,  0 Refill(s), Start Date: 09/22/22 11:50:00 CDT, Disp: , Rfl:     montelukast (SINGULAIR) 10 mg tablet,  0 Refill(s), Start Date: 09/22/22 11:51:00 CDT, Disp: , Rfl:     potassium chloride (MICRO-K) 10 MEQ CpSR,  0 Refill(s), Start Date: 09/22/22 11:50:00 CDT, Disp: , Rfl:     Past Medical History:   Diagnosis Date    Essential (primary) hypertension     GERD without esophagitis     Hypercholesterolemia     Schizoaffective disorder        Past Surgical History:   Procedure Laterality Date    THROMBECTOMY  2020    VAGINAL HYSTERECTOMY  1981       No family history on file.     Social History     Tobacco Use    Smoking status: Never    Smokeless tobacco: Never   Substance Use Topics    Alcohol use: Never    Drug use: Never        Review of Systems       Psychological ROS: negative  Eyes: Trouble seeing near or far  ENT ROS: Painful or difficulty swallowing  Neuro ROS: Difficulty speaking and Unsteady gait  Respiratory ROS: Shortness of breath  Gastrointestinal ROS: Negative  Cardiovascular ROS: Difficulty breathing at night  Genitourinary ROS: Frequent urination, Leaking of urine, and Waking to urinate  Integumentary ROS: Negative  Musculoskeletal ROS: Negative    Physical Exam:   There were no vitals taken for this visit.  General: No acute distress   Skin: no lesions  Chest: clear  CV: regular rate  Musculoskeletal: normal lower extremity strength  Sensation to light touch in the lower extremities is normal   Extremities: well perfused with normal pulses in the distal extremities, no peripheral edema noted  Abdominal exam: soft non tender, no palpable masses  External genitalia: normal female genitalia, no lesions, normal female hair distribution, no clitoral enlargement, nontender, no scars  The perineal reflexes are present  Exam Chaperoned by: Judy Correa LPN  Vagina/cervix: atrophic vagina, no discharge, exudate, lesion, or erythema, laceration in the middle of the anterior vaginal wall epithelium (cracked) from pressure - now healing  Bimanual exam: Surgically absent, no palpable masses, non-tender exam  Urethra: no prolapse, caruncle absent  Rectal: Medium tone, Poor squeeze, no palpable masses, No stool in the vault, No blood on the glove  Stress test: Negative  Pelvic strength 0/5  Post void residual volume: 320 mL via straight cath    POPQ (Date 2023): Aa +3 Ba +8.5  C +8.5  GH 5.5 PB 3 TVL 9 Ap +2 Bp +3 D N/A      Pessary fitting:  Tried cube, Flexishelf, Gellhorn, Shaatz, and double pessary. All came out.  Surgery scheduled for 2023    ASSESSMENT:  Shavon Hahn is a 68 y.o.    1. Complete prolapse of vaginal vault    2. Incomplete bladder emptying    3. Urinary frequency    4. Overflow incontinence of urine due to prolapse  of female genital organ    5. Genitourinary syndrome of menopause        PLAN:  The pathophysiology of the above condition has been discussed with the patient who expressed understanding.   We discussed the differences between stress and urge incontinence.    Pelvic Organ Prolapse - The patient was counseled regarding the pathophysiology of Prolapse. The patient was counseled regarding the possible natural progression of prolapse and the clinical consequences of worsening prolapse. We discussed that in most cases prolapse is not harmful but can negatively affect your quality of life.  She was counseled regarding management strategies including expectant management, pelvic floor physical therapy, Kegel's, avoidance of constipation and heavy lifting and pessary placement. Surgical management reviewed. Cure from surgical intervention is not guaranteed and there is risk of occult stress incontinence. Patient has baseline mixed incontinence, however, she did not demonstrate leak on previous exam and would need to for intervention.  Recommend SSLS, A/P, Bulking.  Patient will need UDT with prolapse reduced before surgery.  -Pessary fitting done today, all pessaries fell out  -Patient scheduled for SSLS, A/P on 12/21/2023    Overflow incontinence - No leak on testing.  Incontinence likely represents overflow. Increased bladder capacity and decreased sensation. Advised timed voids. Will bulk if needed (i.e. if she leaks once the prolapse is reduced).   - Cystoscopy done today.  Large capacity bladder, petechiae noted at the trigone, squamous metaplasia, small bladder diverticulum in posterior wall of bladder 3 mm. Possible bladder biopsy in OR.    Incomplete bladder emptying - patient with 320 cc PVR.  Urethral kinking secondary to prolapse.  Will order renal U/S.  Patient will decide between surgery and pessary placement to allow for proper drainage of the bladder.  -Renal US normal.  Patient with cholelithiasis and a  small left renal cyst.  No hydronephrosis.    Genitourinary syndrome of menopause/Vaginal atrophy - due to a hypoestrogenic state in the vagina. Though it is most common in postmenopausal women (up to 50-70%  of postmenopausal women being symptomatic at least to some degree), it can affect up to 15% of premenopausal women was well.  GSM is a spectrum of changes such as vaginal dryness, dyspareunia, irritation, burning, itching, dysuria, urgency, stress/urgency incontinence, recurrent UTIs, urethral prolapse, decreased elasticity, and ischemia of the vesical trigone. If unable to use hormonal treatments, patient can use vaginal moisturizers such as: Replens, Revaree, or Womaness Davily V Soothe. However, if there is no contraindication to use, vaginal estrogen is the best treatment for GSM. Encouraged use of vaginal estrogen as it will restore some elasticity to the vagina and has been shown to decrease the rate of recurrent UTIs, decrease vaginal pH, and increase vaginal lactobacillus.  A pea-sized amount to the vagina every night for 14 days, then 2-3 times per week.  Handout given on low dose vaginal estrogen.    Follow up post op    PATRICK Gallardo MD  Pelvic Medicine and Reconstructive Surgery  Urogynecology  [facility]    40 minutes was spent face to face with patient >50% of the time was spent in counseling and coordination of care.

## 2023-12-12 NOTE — H&P (VIEW-ONLY)
"PELVIC MEDICINE AND RECONSTRUCTIVE SURGERY H&P NOTE    CHIEF COMPLAINT:  Follow up regarding POP    HISTORY OF PRESENT ILLNESS:   Shavon Hahn is a 68 y.o. female  Patient reports first noticing symptoms .    Her symptoms consist of vaginal bulge.    She reports pelvic pressure, vaginal bulge, or vaginal discomfort.    She denies vaginal bleeding, vaginal discharge, dysuria, hematuria.  She reports urinary frequency, urgency  Patient also denies abdominal or pelvic pain.      She states she first noticed the bulge after the birth of her last baby in .  She had a vaginal hysterectomy soon afterward.  For the past 2 years it has "really been out."    Patient presents with symptoms of a vaginal bulge for .  Size of the bulge:  golf ball  Bulge becoming progressively worse over time: Yes:    Bulge limits physical activity: Yes:    Tried a pessary: Yes:    Tried Kegels: No  Prior Surgery for prolapse: No  Prior Surgery for incontinence: No  Splinting to void/BM: No    Voids during the day: q1-2h  Voids at nighttime: 2  Leakage of urine with coughing or exercise: Yes  Leakage of urine with urgency: Yes  Pad for leakage of urine: Yes   -how often do you change your pad? 2 times  Sensation of incomplete emptying: No  Stream intermittent  Hematuria Denies  Kidney stones Denies    Interim Hx:  Patient presents in her normal state of health.  She presents for cystoscopy and consents today.  She is scheduled for surgery on 2023    Gyn Hx:  Patient reports h/o Normal paps; denies h/o Fibroids, denies h/o Endometriosis, denies h/o Ovarian Cysts, denies h/o abnormal paps, denies h/o LEEP/Cryo  Menopause age: h/o hyst   No LMP recorded.   V5  Obstetric complications? Yes  Vaginal births? yes  -use of forceps or vacuum? no     OB History    Para Term  AB Living   5 4 4 0 1 4   SAB IAB Ectopic Multiple Live Births   1 0 0 0 4      # Outcome Date GA Lbr Kin/2nd Weight Sex Delivery Anes PTL Lv "   5 SAB 1981   4.082 kg (9 lb) M    FD   4 Term 1979   3.459 kg (7 lb 10 oz) M Vag-Spont   CIELO   3 Term 1978   3.714 kg (8 lb 3 oz) M Vag-Spont   CIELO   2 Term 1976   3.629 kg (8 lb) M Vag-Spont   CIELO   1 Term 1975   2.948 kg (6 lb 8 oz) F Vag-Spont   CIELO        Review of patient's allergies indicates:   Allergen Reactions    Esomeprazole magnesium     Codeine Other (See Comments)     Headaches and Dizziness           Current Outpatient Medications:     atorvastatin calcium (ATORVASTATIN ORAL),  0 Refill(s), Start Date: 09/22/22 11:49:00 CDT, Disp: , Rfl:     benztropine (COGENTIN) 1 MG tablet,  0 Refill(s), Start Date: 09/22/22 11:51:00 CDT, Disp: , Rfl:     esomeprazole (NEXIUM) 40 MG capsule, Take 40 mg by mouth before breakfast., Disp: , Rfl:     estradioL (ESTRACE) 0.01 % (0.1 mg/gram) vaginal cream, Use a pea sized amount to the vagina once a night for 14 nights when initiating the medication, then 2-3 times per week.  DO NOT use the applicator, Disp: 42.5 g, Rfl: 10    haloperidoL (HALDOL) 5 MG tablet,  0 Refill(s), Start Date: 09/22/22 11:51:00 CDT, Disp: , Rfl:     meloxicam (MOBIC) 7.5 MG tablet,  0 Refill(s), Start Date: 09/22/22 11:51:00 CDT, Disp: , Rfl:     metoprolol tartrate (LOPRESSOR) 50 MG tablet,  0 Refill(s), Start Date: 09/22/22 11:50:00 CDT, Disp: , Rfl:     montelukast (SINGULAIR) 10 mg tablet,  0 Refill(s), Start Date: 09/22/22 11:51:00 CDT, Disp: , Rfl:     potassium chloride (MICRO-K) 10 MEQ CpSR,  0 Refill(s), Start Date: 09/22/22 11:50:00 CDT, Disp: , Rfl:     Past Medical History:   Diagnosis Date    Essential (primary) hypertension     GERD without esophagitis     Hypercholesterolemia     Schizoaffective disorder        Past Surgical History:   Procedure Laterality Date    THROMBECTOMY  2020    VAGINAL HYSTERECTOMY  1981       No family history on file.     Social History     Tobacco Use    Smoking status: Never    Smokeless tobacco: Never   Substance Use Topics    Alcohol use: Never     Drug use: Never        Review of Systems      Psychological ROS: negative  Eyes: Trouble seeing near or far  ENT ROS: Painful or difficulty swallowing  Neuro ROS: Difficulty speaking and Unsteady gait  Respiratory ROS: Shortness of breath  Gastrointestinal ROS: Negative  Cardiovascular ROS: Difficulty breathing at night  Genitourinary ROS: Frequent urination, Leaking of urine, and Waking to urinate  Integumentary ROS: Negative  Musculoskeletal ROS: Negative    Physical Exam:   /84   Pulse 78   Wt 87.1 kg (192 lb)   General: No acute distress   Skin: no lesions  Chest: clear  CV: regular rate  Musculoskeletal: normal lower extremity strength  Sensation to light touch in the lower extremities is normal   Extremities: well perfused with normal pulses in the distal extremities, no peripheral edema noted  Abdominal exam: soft non tender, no palpable masses  External genitalia: normal female genitalia, no lesions, normal female hair distribution, no clitoral enlargement, nontender, no scars  The perineal reflexes are present  Exam Chaperoned by: Judy Correa LPN  Vagina/cervix: atrophic vagina, no discharge, exudate, lesion, or erythema, laceration in the middle of the anterior vaginal wall epithelium (cracked) from pressure - now healing  Bimanual exam: Surgically absent, no palpable masses, non-tender exam  Urethra: no prolapse, caruncle absent  Rectal: Medium tone, Poor squeeze, no palpable masses, No stool in the vault, No blood on the glove  Stress test: Negative  Pelvic strength 0/5  Post void residual volume: 320 mL via straight cath    POPQ (Date 2023): Aa +3 Ba +8.5  C +8.5  GH 5.5 PB 3 TVL 9 Ap +2 Bp +3 D N/A      Pessary fitting:  Tried cube, Flexishelf, Gellhorn, Shaatz, and double pessary. All came out.  Surgery scheduled for 2023    ASSESSMENT:  Shavon Hahn is a 68 y.o.    1. Complete prolapse of vaginal vault    2. Incomplete bladder emptying    3. Urinary frequency    4.  Overflow incontinence of urine due to prolapse of female genital organ    5. Genitourinary syndrome of menopause        PLAN:  The pathophysiology of the above condition has been discussed with the patient who expressed understanding.   We discussed the differences between stress and urge incontinence.    Pelvic Organ Prolapse - The patient was counseled regarding the pathophysiology of Prolapse. The patient was counseled regarding the possible natural progression of prolapse and the clinical consequences of worsening prolapse. We discussed that in most cases prolapse is not harmful but can negatively affect your quality of life.  She was counseled regarding management strategies including expectant management, pelvic floor physical therapy, Kegel's, avoidance of constipation and heavy lifting and pessary placement. Surgical management reviewed. Cure from surgical intervention is not guaranteed and there is risk of occult stress incontinence. Patient has baseline mixed incontinence, however, she did not demonstrate leak on previous exam and would need to for intervention.  Recommend SSLS, A/P, Bulking.  Patient will need UDT with prolapse reduced before surgery.  -Pessary fitting done today, all pessaries fell out  -Patient scheduled for SSLS, A/P on 12/21/2023    Overflow incontinence - No leak on testing.  Incontinence likely represents overflow. Increased bladder capacity and decreased sensation. Advised timed voids. Will bulk if needed (i.e. if she leaks once the prolapse is reduced).   - Cystoscopy done today.  Large capacity bladder, petechiae noted at the trigone, squamous metaplasia, small bladder diverticulum in posterior wall of bladder 3 mm. Possible bladder biopsy in OR.    Incomplete bladder emptying - patient with 320 cc PVR.  Urethral kinking secondary to prolapse.  Will order renal U/S.  Patient will decide between surgery and pessary placement to allow for proper drainage of the bladder.  -Renal US  normal.  Patient with cholelithiasis and a small left renal cyst.  No hydronephrosis.    Genitourinary syndrome of menopause/Vaginal atrophy - due to a hypoestrogenic state in the vagina. Though it is most common in postmenopausal women (up to 50-70%  of postmenopausal women being symptomatic at least to some degree), it can affect up to 15% of premenopausal women was well.  GSM is a spectrum of changes such as vaginal dryness, dyspareunia, irritation, burning, itching, dysuria, urgency, stress/urgency incontinence, recurrent UTIs, urethral prolapse, decreased elasticity, and ischemia of the vesical trigone. If unable to use hormonal treatments, patient can use vaginal moisturizers such as: Replens, Revaree, or Womaness Davily V Soothe. However, if there is no contraindication to use, vaginal estrogen is the best treatment for GSM. Encouraged use of vaginal estrogen as it will restore some elasticity to the vagina and has been shown to decrease the rate of recurrent UTIs, decrease vaginal pH, and increase vaginal lactobacillus.  A pea-sized amount to the vagina every night for 14 days, then 2-3 times per week.  Handout given on low dose vaginal estrogen.    Follow up post op    PATRICK Gallardo MD  Pelvic Medicine and Reconstructive Surgery  Urogynecology  [facility]    40 minutes was spent face to face with patient >50% of the time was spent in counseling and coordination of care.

## 2023-12-15 RX ORDER — ASPIRIN 81 MG/1
81 TABLET ORAL DAILY
COMMUNITY

## 2023-12-18 ENCOUNTER — ANESTHESIA EVENT (OUTPATIENT)
Dept: SURGERY | Facility: HOSPITAL | Age: 68
End: 2023-12-18
Payer: MEDICARE

## 2023-12-19 ENCOUNTER — HOSPITAL ENCOUNTER (OUTPATIENT)
Dept: RADIOLOGY | Facility: HOSPITAL | Age: 68
Discharge: HOME | End: 2023-12-19
Attending: OBSTETRICS & GYNECOLOGY
Payer: MEDICARE

## 2023-12-19 DIAGNOSIS — N99.3 COMPLETE PROLAPSE OF VAGINAL VAULT: ICD-10-CM

## 2023-12-19 DIAGNOSIS — Z01.818 PRE-OP EXAM: ICD-10-CM

## 2023-12-19 PROCEDURE — 71046 X-RAY EXAM CHEST 2 VIEWS: CPT | Mod: TC

## 2023-12-20 RX ORDER — PANTOPRAZOLE SODIUM 40 MG/1
40 TABLET, DELAYED RELEASE ORAL EVERY MORNING
COMMUNITY

## 2023-12-20 RX ORDER — SPIRONOLACTONE 50 MG/1
50 TABLET, FILM COATED ORAL
COMMUNITY

## 2023-12-20 RX ORDER — GABAPENTIN 300 MG/1
300 CAPSULE ORAL
COMMUNITY
End: 2024-01-04 | Stop reason: SDUPTHER

## 2023-12-20 NOTE — PRE-PROCEDURE INSTRUCTIONS
"[facility]: Outpatient Surgery  Preprocedure Instructions    Expectations: "Because of inconsistent procedure completion times, an unexpected wait may occur. The physicians would like you to be here to prepare in the event they run ahead of time. We will make you as comfortable as possible and keep you informed. We apologize in advance if this happens."     Your arrival time for your surgery or procedure is 6:30 am.   We ask patients to arrive about 2 hours before surgery to allow for enough time to review your health history & medications, start your IV, complete any outstanding labwork or tests, and meet your Anesthesiologist.    We are located at [facility], [address] [place] [address].    Enter through the [place] entrance next to the Emergency Room, and come to the 6th floor to the Outpatient Surgery Department.    If you are in need of a wheelchair the morning of surgery please call [phone] about 15 minutes before you arrive. Parking is available in our parking garage located off [facility], between the hospital and [facility].      Visitory Policy:  You are allowed 2 adult visitors to be with you in the hospital. Please, no switching visitors in pre-op area. All hospital visitors should be in good current health.  No small children.  We will update you and your family hourly on the progression of surgery and any unexpected delays.      What to Bring:  Please have your ID, insurance cards, and all home medication bottles with you at check in.  Bring your CPAP machine if one is used at home.     Fasting:  Nothing to eat or drink after midnight the night before your procedure. This includes no ice, gum, hard candies, and/or tobacco products.    Medications:  Follow your doctor's instructions for taking any medications on the morning of your procedure.  If no instructions for taking medications were given, do not take any medications but bring your " medications in their bottles to your procedure check in.     Follow your doctor's preoperative instructions regarding skin prep, bowel prep, bathing, or showering prior to your procedure.  If any special soaps were provided to you, please use according to your doctor's instructions. If no instructions were given from your doctor, take a good bath or shower with antibacterial soap the night before and the morning of your procedure.  On the morning of procedure, wear loose, comfortable clothing.  No lotions, makeup, perfumes, colognes, deodorant, or jewelry to your procedure.  Removable items (glasses, contact lenses, dentures, retainers, hearing aids) need to be removed for your procedure.  Bring your storage containers for these items if you wear them.     Artificial nails, body jewelry, eyelash extensions, and/or hair extensions with metal clips are not allowed during your surgery.  If you currently wear any of these items, please arrange for them to be removed prior to your arrival to the hospital.     Outpatient or Same Day Surgeries:  Any patients receiving sedation/anesthesia are advised not to drive for 24 hours after their procedure.  We do not allow patients to drive themselves home after discharge.  If you are going home after your procedure, please have someone available to drive you home from the hospital.     You may call the Outpatient Surgery Department at [phone] with any questions or concerns.  We are looking forward to meeting you and taking great care of you for your procedure.  Thank you for choosing [facility] for your surgical needs.

## 2023-12-21 ENCOUNTER — ANESTHESIA (OUTPATIENT)
Dept: SURGERY | Facility: HOSPITAL | Age: 68
End: 2023-12-21
Payer: MEDICARE

## 2023-12-21 ENCOUNTER — HOSPITAL ENCOUNTER (OUTPATIENT)
Facility: HOSPITAL | Age: 68
Discharge: HOME | End: 2023-12-22
Attending: OBSTETRICS & GYNECOLOGY | Admitting: OBSTETRICS & GYNECOLOGY
Payer: MEDICARE

## 2023-12-21 DIAGNOSIS — N99.3 COMPLETE PROLAPSE OF VAGINAL VAULT: ICD-10-CM

## 2023-12-21 DIAGNOSIS — N81.9 VAGINAL VAULT PROLAPSE: Primary | ICD-10-CM

## 2023-12-21 PROCEDURE — 25000003 PHARM REV CODE 250: Performed by: OBSTETRICS & GYNECOLOGY

## 2023-12-21 PROCEDURE — 37000009 HC ANESTHESIA EA ADD 15 MINS: Performed by: OBSTETRICS & GYNECOLOGY

## 2023-12-21 PROCEDURE — 63600175 PHARM REV CODE 636 W HCPCS: Performed by: REGISTERED NURSE

## 2023-12-21 PROCEDURE — 25000003 PHARM REV CODE 250: Performed by: REGISTERED NURSE

## 2023-12-21 PROCEDURE — 27201423 OPTIME MED/SURG SUP & DEVICES STERILE SUPPLY: Performed by: OBSTETRICS & GYNECOLOGY

## 2023-12-21 PROCEDURE — G0378 HOSPITAL OBSERVATION PER HR: HCPCS

## 2023-12-21 PROCEDURE — P9047 ALBUMIN (HUMAN), 25%, 50ML: HCPCS | Mod: JZ,JG | Performed by: NURSE ANESTHETIST, CERTIFIED REGISTERED

## 2023-12-21 PROCEDURE — 63600175 PHARM REV CODE 636 W HCPCS: Performed by: OBSTETRICS & GYNECOLOGY

## 2023-12-21 PROCEDURE — 71000039 HC RECOVERY, EACH ADD'L HOUR: Performed by: OBSTETRICS & GYNECOLOGY

## 2023-12-21 PROCEDURE — 36620 ARTERIAL: ICD-10-PCS | Mod: 59,,, | Performed by: ANESTHESIOLOGY

## 2023-12-21 PROCEDURE — 36000708 HC OR TIME LEV III 1ST 15 MIN: Performed by: OBSTETRICS & GYNECOLOGY

## 2023-12-21 PROCEDURE — 57260 CMBN ANT PST COLPRHY: CPT | Mod: ,,, | Performed by: OBSTETRICS & GYNECOLOGY

## 2023-12-21 PROCEDURE — 36000709 HC OR TIME LEV III EA ADD 15 MIN: Performed by: OBSTETRICS & GYNECOLOGY

## 2023-12-21 PROCEDURE — 36620 INSERTION CATHETER ARTERY: CPT | Mod: 59,,, | Performed by: ANESTHESIOLOGY

## 2023-12-21 PROCEDURE — D9220A PRA ANESTHESIA: Mod: CRNA,,, | Performed by: NURSE ANESTHETIST, CERTIFIED REGISTERED

## 2023-12-21 PROCEDURE — 63600175 PHARM REV CODE 636 W HCPCS: Performed by: NURSE ANESTHETIST, CERTIFIED REGISTERED

## 2023-12-21 PROCEDURE — 57282 COLPOPEXY EXTRAPERITONEAL: CPT | Mod: 51,,, | Performed by: OBSTETRICS & GYNECOLOGY

## 2023-12-21 PROCEDURE — D9220A PRA ANESTHESIA: ICD-10-PCS | Mod: ANES,,, | Performed by: ANESTHESIOLOGY

## 2023-12-21 PROCEDURE — D9220A PRA ANESTHESIA: Mod: ANES,,, | Performed by: ANESTHESIOLOGY

## 2023-12-21 PROCEDURE — 25000003 PHARM REV CODE 250: Performed by: NURSE ANESTHETIST, CERTIFIED REGISTERED

## 2023-12-21 PROCEDURE — 57260 PR COMBINED ANT/POST COLPORRHAPHY: ICD-10-PCS | Mod: ,,, | Performed by: OBSTETRICS & GYNECOLOGY

## 2023-12-21 PROCEDURE — 71000033 HC RECOVERY, INTIAL HOUR: Performed by: OBSTETRICS & GYNECOLOGY

## 2023-12-21 PROCEDURE — 37000008 HC ANESTHESIA 1ST 15 MINUTES: Performed by: OBSTETRICS & GYNECOLOGY

## 2023-12-21 PROCEDURE — 11000001 HC ACUTE MED/SURG PRIVATE ROOM

## 2023-12-21 PROCEDURE — C1713 ANCHOR/SCREW BN/BN,TIS/BN: HCPCS | Performed by: OBSTETRICS & GYNECOLOGY

## 2023-12-21 PROCEDURE — 57282 PR REVAGINAL PROLAPSE,SACROSP LIG: ICD-10-PCS | Mod: 51,,, | Performed by: OBSTETRICS & GYNECOLOGY

## 2023-12-21 PROCEDURE — D9220A PRA ANESTHESIA: ICD-10-PCS | Mod: CRNA,,, | Performed by: NURSE ANESTHETIST, CERTIFIED REGISTERED

## 2023-12-21 RX ORDER — ONDANSETRON 2 MG/ML
4 INJECTION INTRAMUSCULAR; INTRAVENOUS EVERY 8 HOURS PRN
Status: DISCONTINUED | OUTPATIENT
Start: 2023-12-21 | End: 2023-12-22 | Stop reason: HOSPADM

## 2023-12-21 RX ORDER — SODIUM CHLORIDE, SODIUM LACTATE, POTASSIUM CHLORIDE, CALCIUM CHLORIDE 600; 310; 30; 20 MG/100ML; MG/100ML; MG/100ML; MG/100ML
INJECTION, SOLUTION INTRAVENOUS CONTINUOUS
Status: DISCONTINUED | OUTPATIENT
Start: 2023-12-21 | End: 2023-12-22 | Stop reason: HOSPADM

## 2023-12-21 RX ORDER — ALBUMIN HUMAN 250 G/1000ML
SOLUTION INTRAVENOUS
Status: DISCONTINUED | OUTPATIENT
Start: 2023-12-21 | End: 2023-12-21

## 2023-12-21 RX ORDER — ACETAMINOPHEN 10 MG/ML
1000 INJECTION, SOLUTION INTRAVENOUS ONCE
Status: COMPLETED | OUTPATIENT
Start: 2023-12-21 | End: 2023-12-21

## 2023-12-21 RX ORDER — HYDROMORPHONE HYDROCHLORIDE 2 MG/ML
INJECTION, SOLUTION INTRAMUSCULAR; INTRAVENOUS; SUBCUTANEOUS
Status: DISCONTINUED | OUTPATIENT
Start: 2023-12-21 | End: 2023-12-21

## 2023-12-21 RX ORDER — KETOROLAC TROMETHAMINE 30 MG/ML
15 INJECTION, SOLUTION INTRAMUSCULAR; INTRAVENOUS EVERY 6 HOURS
Status: DISCONTINUED | OUTPATIENT
Start: 2023-12-21 | End: 2023-12-22 | Stop reason: HOSPADM

## 2023-12-21 RX ORDER — KETOROLAC TROMETHAMINE 10 MG/1
10 TABLET, FILM COATED ORAL EVERY 6 HOURS
Status: DISCONTINUED | OUTPATIENT
Start: 2023-12-21 | End: 2023-12-21

## 2023-12-21 RX ORDER — GLYCOPYRROLATE 0.2 MG/ML
INJECTION INTRAMUSCULAR; INTRAVENOUS
Status: DISCONTINUED | OUTPATIENT
Start: 2023-12-21 | End: 2023-12-21

## 2023-12-21 RX ORDER — ONDANSETRON 2 MG/ML
INJECTION INTRAMUSCULAR; INTRAVENOUS
Status: DISCONTINUED | OUTPATIENT
Start: 2023-12-21 | End: 2023-12-21

## 2023-12-21 RX ORDER — PHENYLEPHRINE HYDROCHLORIDE 10 MG/ML
INJECTION INTRAVENOUS
Status: DISCONTINUED | OUTPATIENT
Start: 2023-12-21 | End: 2023-12-21

## 2023-12-21 RX ORDER — PHENAZOPYRIDINE HYDROCHLORIDE 200 MG/1
200 TABLET, FILM COATED ORAL ONCE
Status: COMPLETED | OUTPATIENT
Start: 2023-12-21 | End: 2023-12-21

## 2023-12-21 RX ORDER — TRAMADOL HYDROCHLORIDE 50 MG/1
100 TABLET ORAL EVERY 6 HOURS PRN
Status: DISCONTINUED | OUTPATIENT
Start: 2023-12-21 | End: 2023-12-22 | Stop reason: HOSPADM

## 2023-12-21 RX ORDER — LIDOCAINE HYDROCHLORIDE 20 MG/ML
INJECTION, SOLUTION EPIDURAL; INFILTRATION; INTRACAUDAL; PERINEURAL
Status: DISCONTINUED | OUTPATIENT
Start: 2023-12-21 | End: 2023-12-21

## 2023-12-21 RX ORDER — GABAPENTIN 300 MG/1
300 CAPSULE ORAL ONCE
Status: COMPLETED | OUTPATIENT
Start: 2023-12-21 | End: 2023-12-21

## 2023-12-21 RX ORDER — ACETAMINOPHEN 325 MG/1
650 TABLET ORAL EVERY 6 HOURS
Status: DISCONTINUED | OUTPATIENT
Start: 2023-12-21 | End: 2023-12-21

## 2023-12-21 RX ORDER — PROPOFOL 10 MG/ML
VIAL (ML) INTRAVENOUS
Status: DISCONTINUED | OUTPATIENT
Start: 2023-12-21 | End: 2023-12-21

## 2023-12-21 RX ORDER — DEXAMETHASONE SODIUM PHOSPHATE 4 MG/ML
INJECTION, SOLUTION INTRA-ARTICULAR; INTRALESIONAL; INTRAMUSCULAR; INTRAVENOUS; SOFT TISSUE
Status: DISCONTINUED | OUTPATIENT
Start: 2023-12-21 | End: 2023-12-21

## 2023-12-21 RX ORDER — EPHEDRINE SULFATE 50 MG/ML
INJECTION, SOLUTION INTRAVENOUS
Status: DISCONTINUED | OUTPATIENT
Start: 2023-12-21 | End: 2023-12-21

## 2023-12-21 RX ORDER — HYDROMORPHONE HYDROCHLORIDE 2 MG/ML
0.2 INJECTION, SOLUTION INTRAMUSCULAR; INTRAVENOUS; SUBCUTANEOUS EVERY 5 MIN PRN
Status: DISCONTINUED | OUTPATIENT
Start: 2023-12-21 | End: 2023-12-21 | Stop reason: HOSPADM

## 2023-12-21 RX ORDER — ONDANSETRON 2 MG/ML
4 INJECTION INTRAMUSCULAR; INTRAVENOUS DAILY PRN
Status: DISCONTINUED | OUTPATIENT
Start: 2023-12-21 | End: 2023-12-21 | Stop reason: HOSPADM

## 2023-12-21 RX ORDER — ROCURONIUM BROMIDE 10 MG/ML
INJECTION, SOLUTION INTRAVENOUS
Status: DISCONTINUED | OUTPATIENT
Start: 2023-12-21 | End: 2023-12-21

## 2023-12-21 RX ORDER — FENTANYL CITRATE 50 UG/ML
INJECTION, SOLUTION INTRAMUSCULAR; INTRAVENOUS
Status: DISCONTINUED | OUTPATIENT
Start: 2023-12-21 | End: 2023-12-21

## 2023-12-21 RX ORDER — MORPHINE SULFATE 10 MG/ML
4 INJECTION INTRAMUSCULAR; INTRAVENOUS; SUBCUTANEOUS EVERY 4 HOURS PRN
Status: DISCONTINUED | OUTPATIENT
Start: 2023-12-21 | End: 2023-12-22 | Stop reason: HOSPADM

## 2023-12-21 RX ORDER — DIPHENHYDRAMINE HYDROCHLORIDE 50 MG/ML
12.5 INJECTION INTRAMUSCULAR; INTRAVENOUS EVERY 4 HOURS PRN
Status: DISCONTINUED | OUTPATIENT
Start: 2023-12-21 | End: 2023-12-22 | Stop reason: HOSPADM

## 2023-12-21 RX ORDER — MIDAZOLAM HYDROCHLORIDE 1 MG/ML
INJECTION INTRAMUSCULAR; INTRAVENOUS
Status: DISCONTINUED | OUTPATIENT
Start: 2023-12-21 | End: 2023-12-21

## 2023-12-21 RX ORDER — GABAPENTIN 100 MG/1
100 CAPSULE ORAL 3 TIMES DAILY
Status: DISCONTINUED | OUTPATIENT
Start: 2023-12-21 | End: 2023-12-22 | Stop reason: HOSPADM

## 2023-12-21 RX ORDER — CELECOXIB 200 MG/1
400 CAPSULE ORAL ONCE
Status: COMPLETED | OUTPATIENT
Start: 2023-12-21 | End: 2023-12-21

## 2023-12-21 RX ORDER — ACETAMINOPHEN 500 MG
1000 TABLET ORAL ONCE
Status: COMPLETED | OUTPATIENT
Start: 2023-12-21 | End: 2023-12-21

## 2023-12-21 RX ORDER — ONDANSETRON 4 MG/1
4 TABLET, ORALLY DISINTEGRATING ORAL ONCE
Status: COMPLETED | OUTPATIENT
Start: 2023-12-21 | End: 2023-12-21

## 2023-12-21 RX ORDER — CEFAZOLIN SODIUM 2 G/50ML
2 SOLUTION INTRAVENOUS ONCE
Status: COMPLETED | OUTPATIENT
Start: 2023-12-21 | End: 2023-12-21

## 2023-12-21 RX ORDER — SODIUM CHLORIDE 0.9 % (FLUSH) 0.9 %
10 SYRINGE (ML) INJECTION
Status: DISCONTINUED | OUTPATIENT
Start: 2023-12-21 | End: 2023-12-21 | Stop reason: HOSPADM

## 2023-12-21 RX ADMIN — CELECOXIB 400 MG: 200 CAPSULE ORAL at 07:12

## 2023-12-21 RX ADMIN — PHENYLEPHRINE HYDROCHLORIDE 25 MCG/MIN: 10 INJECTION INTRAVENOUS at 10:12

## 2023-12-21 RX ADMIN — PHENYLEPHRINE HYDROCHLORIDE 100 MCG: 10 INJECTION INTRAVENOUS at 09:12

## 2023-12-21 RX ADMIN — ACETAMINOPHEN 1000 MG: 500 TABLET ORAL at 07:12

## 2023-12-21 RX ADMIN — PHENYLEPHRINE HYDROCHLORIDE 50 MCG: 10 INJECTION INTRAVENOUS at 02:12

## 2023-12-21 RX ADMIN — KETOROLAC TROMETHAMINE 15 MG: 30 INJECTION, SOLUTION INTRAMUSCULAR; INTRAVENOUS at 09:12

## 2023-12-21 RX ADMIN — FENTANYL CITRATE 100 MCG: 50 INJECTION, SOLUTION INTRAMUSCULAR; INTRAVENOUS at 09:12

## 2023-12-21 RX ADMIN — SODIUM CHLORIDE, POTASSIUM CHLORIDE, SODIUM LACTATE AND CALCIUM CHLORIDE: 600; 310; 30; 20 INJECTION, SOLUTION INTRAVENOUS at 04:12

## 2023-12-21 RX ADMIN — GABAPENTIN 100 MG: 100 CAPSULE ORAL at 09:12

## 2023-12-21 RX ADMIN — GABAPENTIN 300 MG: 300 CAPSULE ORAL at 07:12

## 2023-12-21 RX ADMIN — ACETAMINOPHEN 1000 MG: 10 INJECTION, SOLUTION INTRAVENOUS at 07:12

## 2023-12-21 RX ADMIN — ROCURONIUM BROMIDE 50 MG: 10 SOLUTION INTRAVENOUS at 09:12

## 2023-12-21 RX ADMIN — ALBUMIN (HUMAN) 100 ML: 12.5 SOLUTION INTRAVENOUS at 12:12

## 2023-12-21 RX ADMIN — SODIUM CHLORIDE, POTASSIUM CHLORIDE, SODIUM LACTATE AND CALCIUM CHLORIDE: 600; 310; 30; 20 INJECTION, SOLUTION INTRAVENOUS at 05:12

## 2023-12-21 RX ADMIN — PROPOFOL 125 MG: 10 INJECTION, EMULSION INTRAVENOUS at 09:12

## 2023-12-21 RX ADMIN — CEFAZOLIN SODIUM 2 G: 2 SOLUTION INTRAVENOUS at 09:12

## 2023-12-21 RX ADMIN — EPHEDRINE SULFATE 25 MG: 50 INJECTION INTRAVENOUS at 09:12

## 2023-12-21 RX ADMIN — ONDANSETRON 4 MG: 4 TABLET, ORALLY DISINTEGRATING ORAL at 07:12

## 2023-12-21 RX ADMIN — GLYCOPYRROLATE 0.2 MG: 0.2 INJECTION INTRAMUSCULAR; INTRAVENOUS at 09:12

## 2023-12-21 RX ADMIN — LIDOCAINE HYDROCHLORIDE 40 MG: 20 INJECTION, SOLUTION EPIDURAL; INFILTRATION; INTRACAUDAL; PERINEURAL at 09:12

## 2023-12-21 RX ADMIN — ONDANSETRON 4 MG: 2 INJECTION INTRAMUSCULAR; INTRAVENOUS at 02:12

## 2023-12-21 RX ADMIN — HYDROMORPHONE HYDROCHLORIDE 0.4 MG: 2 INJECTION, SOLUTION INTRAMUSCULAR; INTRAVENOUS; SUBCUTANEOUS at 02:12

## 2023-12-21 RX ADMIN — SUGAMMADEX 100 MG: 100 INJECTION, SOLUTION INTRAVENOUS at 03:12

## 2023-12-21 RX ADMIN — DEXAMETHASONE SODIUM PHOSPHATE 4 MG: 4 INJECTION, SOLUTION INTRA-ARTICULAR; INTRALESIONAL; INTRAMUSCULAR; INTRAVENOUS; SOFT TISSUE at 09:12

## 2023-12-21 RX ADMIN — ALBUMIN (HUMAN) 100 ML: 12.5 SOLUTION INTRAVENOUS at 10:12

## 2023-12-21 RX ADMIN — SODIUM CHLORIDE, SODIUM GLUCONATE, SODIUM ACETATE, POTASSIUM CHLORIDE AND MAGNESIUM CHLORIDE: 526; 502; 368; 37; 30 INJECTION, SOLUTION INTRAVENOUS at 10:12

## 2023-12-21 RX ADMIN — MIDAZOLAM HYDROCHLORIDE 2 MG: 1 INJECTION, SOLUTION INTRAMUSCULAR; INTRAVENOUS at 09:12

## 2023-12-21 RX ADMIN — PHENAZOPYRIDINE 200 MG: 200 TABLET ORAL at 07:12

## 2023-12-21 RX ADMIN — PHENYLEPHRINE HYDROCHLORIDE 100 MCG: 10 INJECTION INTRAVENOUS at 11:12

## 2023-12-21 RX ADMIN — EPHEDRINE SULFATE 25 MG: 50 INJECTION INTRAVENOUS at 10:12

## 2023-12-21 RX ADMIN — ROCURONIUM BROMIDE 25 MG: 10 SOLUTION INTRAVENOUS at 11:12

## 2023-12-21 RX ADMIN — SODIUM CHLORIDE, SODIUM GLUCONATE, SODIUM ACETATE, POTASSIUM CHLORIDE AND MAGNESIUM CHLORIDE: 526; 502; 368; 37; 30 INJECTION, SOLUTION INTRAVENOUS at 09:12

## 2023-12-21 NOTE — NURSING
Nurses Note -- 4 Eyes      12/21/2023   5:18 PM      Skin assessed during: Admit      [x] No Altered Skin Integrity Present    []Prevention Measures Documented      [] Yes- Altered Skin Integrity Present or Discovered   [] LDA Added if Not in Epic (Describe Wound)   [] New Altered Skin Integrity was Present on Admit and Documented in LDA   [] Wound Image Taken    Wound Care Consulted? No    Attending Nurse:  Marly Edwards RN/Staff Member:   Yudith

## 2023-12-21 NOTE — ANESTHESIA PREPROCEDURE EVALUATION
12/21/2023  Shavon Hahn is a 68 y.o., female.    1. Complete prolapse of vaginal vault    2. Incomplete bladder emptying    3. Urinary frequency    4. Overflow incontinence of urine due to prolapse of female genital organ    5. Genitourinary syndrome of menopause        Recent Eval for CVA- dysarthria/dysphagia    Normal left ventricular size and function with ejection fraction of 55 to   60%.   Mild mitral regurgitation.   Neg Bubble      Pre-op Assessment    I have reviewed the Patient Summary Reports.     I have reviewed the Nursing Notes. I have reviewed the NPO Status.   I have reviewed the Medications.     Review of Systems  Anesthesia Hx:  No problems with previous Anesthesia                Cardiovascular:     Hypertension                                  Hypertension         Hepatic/GI:     GERD      Gerd          Psych:  Psychiatric History                  Physical Exam  General: Well nourished, Cooperative, Alert and Oriented    Airway:  Mallampati: II   Mouth Opening: Normal  TM Distance: Normal  Tongue: Normal  Neck ROM: Normal ROM    Dental:  Intact        Anesthesia Plan  Type of Anesthesia, risks & benefits discussed:    Anesthesia Type: Gen ETT  Intra-op Monitoring Plan: Standard ASA Monitors and Art Line  Post Op Pain Control Plan: multimodal analgesia and IV/PO Opioids PRN  Airway Plan: Direct, Post-Induction  Informed Consent: Informed consent signed with the Patient and all parties understand the risks and agree with anesthesia plan.  All questions answered. Patient consented to blood products? Yes  ASA Score: 3  Day of Surgery Review of History & Physical: H&P Update referred to the surgeon/provider.  Anesthesia Plan Notes: LBIV x 2    Ready For Surgery From Anesthesia Perspective.     .

## 2023-12-21 NOTE — ANESTHESIA PROCEDURE NOTES
Arterial    Diagnosis: 1. Complete prolapse of vaginal vault    Patient location during procedure: done in OR    Staffing  Authorizing Provider: Monica Elliott MD  Performing Provider: Pepito Anders CRNA    Staffing  Performed by: Pepito Anders CRNA  Authorized by: Monica Elliott MD    Anesthesiologist was present at the time of the procedure.    Preanesthetic Checklist  Completed: patient identified, IV checked, site marked, risks and benefits discussed, surgical consent, monitors and equipment checked, pre-op evaluation, timeout performed and anesthesia consent givenArterial  Skin Prep: chlorhexidine gluconate and isopropyl alcohol  Local Infiltration: lidocaine  Orientation: left  Location: radial    Catheter Size: 20 G  Catheter placement by Anatomical landmarks. Heme positive aspiration all ports. Insertion Attempts: 1  Assessment  Dressing: secured with tape and tegaderm  Patient: Tolerated well

## 2023-12-21 NOTE — OP NOTE
[facility] - Periop Services  Female Pelvic Medicine and Reconstructive Surgery/Urogynecology  Operative Note    SUMMARY     Date of Procedure: 12/21/2023     Procedure: Procedure(s) (LRB):  FIXATION, LIGAMENT, SACROSPINOUS  Bilateral (Bilateral)  CYSTOSCOPY  COLPORRHAPHY, COMBINED ANTEROPOSTERIOR (N/A)       Surgeon(s) and Role:     * Cordelia Gallardo MD - Primary    Assisting Surgeon: None    Pre-Operative Diagnosis: Vaginal vault prolapse [N81.9]    Post-Operative Diagnosis: Post-Op Diagnosis Codes:     * Vaginal vault prolapse [N81.9]    Anesthesia: General    Operative Findings (including complications, if any): Extensive Stage 4 post-hysterectomy prolapse.  Vaginal exam confirmed the presence of anterior, posterior and apical prolapse. Uterus was surgically absent. Epithelium was strongly adhered to the underlying tissue. At the end of the case all compartments were noted to be well suspended. The cystoscopy performed during the case revealed a normal bladder survey. Ureters had spill bilaterally indicating patency. There was no injury to the bladder or urethra. Rectal exam at the end of the case confirmed no injury or sutures in the rectum.     Description of Technical Procedures:   The patient was identified in the holding area and informed consent was confirmed. She was then taken back to the operating room where a safety check was performed. adequate level of general anesthesia was obtained. The patient was intubated. Once her airway was secured she was placed in the dorsal lithotomy position with fritz stirrups. SCDs were placed on her lower extremities. Careful attention was paid to leg positioning to ensure there was no hyperextension or hyperflexion of her joints. A surgical time-out was completed in which the patient as well as the procedure was identified and confirmed. Intravenous antibiotics were given preoperatively. Exam under anesthesia was performed with the findings as noted  above. The patient's vagina, perineum and abdomen were prepped and draped in the usual sterile fashion. A Landeros catheter was placed in the bladder for drainage.     We then began the posterior repair portion of the procedure. Allis clamps were placed on the left and right hymenal remnants and at the proximal limit of the rectovaginal septal defect along the vaginal epithelium at the apex. The vaginal epithelium was injected with diluted vasopressin. The posterior vaginal epithelium was then incised in the midline. The left and right margins of the vaginal epithelium were grasped with Allis clamps and the vaginal epithelium was dissected off the underlying rectovaginal fibromuscular connective tissue. Individual areas of bleeding were made hemostatic with electrocautery.     The sacrospinous fixation was performed. The fibromuscular connective tissue was then dissected away from the vaginal epithelium on the right side of the vagina in the direction of the ischial spine which was easily palpable. In the direction of the ischial spine we bluntly dissected the peritoneal tissue off the ischial spine.  This was done so that the areolar tissue overlying the sacrospinous ligament was sufficiently cleared and could be visualized. We could place two fingers easily into our dissection. With the index finger palpating the ischial spine on the right-hand side, the Saffron device was used to place an anchor affixed with a suture into the sacrospinous ligament two centimeters medial to the ischial spine using 0 PDS suture.  A second and third suture of 0-PDS was placed again using a Saffron device medial to the first suture. This was then repeated on the left side.  The sutures were then tagged for later fixation to the vaginal cuff.   The sacrospinous sutures were attached to the vaginal cuff at the corner of the apex and tagged.     The procedure continued with the anterior colporrhaphy portion. The urethrovesical junction was  identified using the Landeros bulb. An Allis clamp was placed on the vaginal epithelium at that level. Two additional Allis clamps were placed at the vaginal cuff. At this point, 0.5% Marcaine with epinephrine was injected along the planned vertical vaginal incision and in the direction of the fornices. A scalpel was used to make a vertical incision between the 2 Allis clamps at the apex. The edges of the vaginal epithelium at the apex were then grasped and the Metzenbaum scissors were used to dissect the vesicovaginal fibromuscular connective tissue from the vaginal epithelium in the midline. The dissection was carried laterally in both directions so that the cystocele defect was completely visualized.  The tissue was extremely scarred down and the dissection was extensive. The vesicovaginal fibromuscular connective tissue was then plicated in the midline with interrupted sutures of 2-0 PDS, and three interrupted 2-0 Vicryl sutures were placed to augment the plication. At the end of the plication, there was good correction of the cystocele. The vaginal epithelium was then trimmed and re approximated with a running locked 2-0 vicryl suture.     We then performed cystoscopy with a 70 degree scope, a complete bladder survey was  Performed starting a the dome of the bladder and moving towards the trigone, with normal findings as noted above.     We then completed the posterior repair.  The rectovaginal fibromuscular tissue was then plicated in the midline with interrupted U stitches of 2-0 PDS. Good hemostasis was noted. The vaginal epithelium was then trimmed and then re approximated with running locked 2-0 vicryl.     The bilateral sacrospinous sutures were then cinched down with good elevation of the cuff. We then performed cystoscopy with a 70 degree scope, a complete bladder survey was  Performed starting a the dome of the bladder and moving towards the trigone, with normal findings as noted above.      The  perineoplasty was performed as follows: Allis clamps were placed on the posterior vaginal epithelium at the level of the hymen, then infiltrated with the same local anesthetic. A laura-shaped incision was made with 15 blade. Two deep, interrupted U sutures of 2-0 PDS were used to re-approximate the bulbocavernosus and superficial transverse perineal muscles. The vaginal epithelium was closed using running locked 2-0 Vicryl suture until the level of the hymen where a crown stitch was placed.  The suture continued onto the bulbocavernosus and superficial transverse perineal muscles. The perineal skin was then closed with a subcuticular 2-0 Vicryl suture. Examination at this point demonstrated a vaginal caliber of 5 cm (a loose 3 finger-breadths). A vaginal packing was then placed.    Patient tolerated procedure well. All instrument, needle, and sponge counts were correct. She was cleaned, dried, and brought to the PACU in good condition.    Significant Surgical Tasks Conducted by the Assistant(s), if Applicable: none    Estimated Blood Loss (EBL): 200 mL           Implants:   Implant Name Type Inv. Item Serial No.  Lot No. LRB No. Used Action   saffron anchor     [id number]  8 Implanted   6 anchors implanted (3 per side)    Specimens:   Specimen (24h ago, onward)      None                    Condition: Good    Disposition: PACU - hemodynamically stable.    Attestation: I performed the procedure.    Cordelia Gallardo MD

## 2023-12-21 NOTE — ANESTHESIA POSTPROCEDURE EVALUATION
Anesthesia Post Evaluation    Patient: Shavon Hahn    Procedure(s) Performed: Procedure(s) (LRB):  FIXATION, LIGAMENT, SACROSPINOUS  Bilateral (Bilateral)  CYSTOSCOPY, WITH PERIURETHRAL BULKING AGENT INJECTION (N/A)  COLPORRHAPHY, COMBINED ANTEROPOSTERIOR (N/A)    Final Anesthesia Type: general      Patient location during evaluation: PACU  Patient participation: Yes- Able to Participate  Level of consciousness: awake and alert  Post-procedure vital signs: reviewed and stable  Pain management: adequate  Airway patency: patent      Anesthetic complications: no      Cardiovascular status: hemodynamically stable  Respiratory status: unassisted  Hydration status: euvolemic  Follow-up not needed.              Vitals Value Taken Time   /67 12/21/23 1601   Temp 36 °C (96.8 °F) 12/21/23 1511   Pulse 64 12/21/23 1605   Resp 10 12/21/23 1605   SpO2 100 % 12/21/23 1605   Vitals shown include unvalidated device data.      No case tracking events are documented in the log.      Pain/Santhosh Score: Pain Rating Prior to Med Admin: 8 (12/21/2023  7:31 AM)  Santhosh Score: 8 (12/21/2023  3:50 PM)

## 2023-12-21 NOTE — DISCHARGE INSTRUCTIONS
Post-operative discharge Instructions for care after surgery    Pain Control/Medications:  Please note that pain is a normal part of your post operative recovery and improves over time.    Please take Tylenol 650mg every 6 hours scheduled for 3 days.  Please take Ibuprofen 600 mg every 6 hours scheduled for 3 days.  Alternate the dosing of these medications so you are taking something for pain every 3 hours.    For example:   Tylenol 650 mg at 3pm,   Ibuprofen 600 mg at 6pm  Tylenol 650 mg at 9pm  Ibuprofen 600 at 12 midnight    You may restart ALL of your normal medications taken prior to surgery when you get home, unless instructed otherwise. Please contact your primary care physician for any questions regarding your regular medications. You can use Tramadol 1-2 tablets every 4 hours as needed ONLY for breakthrough pain.  Tramadol should not be the main source of pain control.      Activity:  Please note that you may feel very tired in the weeks following your surgery.  Please note that you may feel very tired in the weeks following your surgery.  Return to activity gradually and in moderation. Especially during the first week after your surgery, allow friends and family to take care of performing major household tasks (cooking & cleaning).  You may resume driving when you are no longer taking narcotic medications and when you are able to push the brakes on your car without feeling significant abdominal discomfort.  Do not lift anything greater than 10 lbs. (ex. full gallon of milk is 8.6 lbs) for 6 weeks.  Use proper lifting technique to reduce strain:  1) Keep items close to your body during lifting.  2) Keep feet shoulder width apart, bend your knees, keep your back straight, and lift with your legs.  3) If you feel like you are straining, ask someone to help you.    It is common to experience vaginal bleeding or spotting and even difficulty in urinary stream or bowel movements for a short period of time. In  order to prevent infection, no swimming or tub baths for 8 weeks. You may take showers instead. Remember that directing the stream of water toward your back, so that your incision is not under direct pressure, may be more comfortable.    Nothing in the vagina (creams, tampons, intercourse, etc.) for 6 weeks unless otherwise specified.Sitz baths (hot salt water baths) once or twice daily can be used to aid in the vaginal and pelvic discomfort. You can readily obtain a Sitz bath that will sit in your toilet at home at any large pharmacy or medical supply house. Other than the Sitz.bath, nothing inside the vagina. No intercourse or tampons. No heavy lifting (anything heavier than 20 pounds) for two to four weeks after the operation.     Diet/Bowel Function:  You may experience constipation after your surgery.  Upon returning to home, you may eat or drink anything you like. In order to avoid straining with bowel movements, which may affect your comfort and healing process, take any prescribed stool softener as directed. Also, stay well hydrated and increase dietary fiber intake as needed. You may also take an over the counter laxative (i.e. miralax, dulcolax, milk of magnesia).    Urination:  Following surgery, you may notice that your urination is slightly different (more frequent, slower stream, feeling of urgency after urination and even occasional urinary leaking). This is normal and will improve with time as the nerves and tissues heal. If you are unable to urinate normally prior to discharge, you will be sent home with both a bladder catheter and bag (Landeros) or taught intermittent self-catheterization.  Someone will teach you about either of these before you leave the hospital if needed.  1) If sent home with a bladder catheter and bag (Landeros), you will need to return to clinic at [address] [place] [address], [address] in 1 week for removal of the device and trial of urination. To schedule the appointment please call office.  [phone]     2) If you are sent home with instructions to perform intermittent self-catheterization, you will be instructed to keep a record of this process. After 3 days of recording information, please call your surgeon's office during office hours with these results, and you will be instructed about what to do next.      Postoperative Follow-up:  Unless instructed otherwise, when you get home from the hospital, please call your surgeon's office to schedule a postoperative check. Usually, this will be scheduled for 2 weeks and 6 weeks from the date of your surgery.  Additionally, if you are sent home requiring catheterization, don't forget to schedule follow-up appointments as noted above.      Weeks 2 - 6   Many women will need to take a full six weeks off from their jobs or any heavy physical activity until the vaginal area is completed healed. Remember: itching is very common in the healing phase, and may not necessarily indicate a vaginal infection (such as a yeast infection). Please call the office if you have a fever of 101 F or higher. Vaginal odor, uncontrollable leakage of urine, or if your pain is not controlled with the pain medicines prescribed. For any questions regarding post-operative care, or any concerns that arise, please contact the office.     Please watch for the following symptoms after your surgery: Fever >101º F, painful urination, vaginal bleeding greater than 2 sanitary pads per hour,  persistent nausea/vomiting, pain which does not improve after taking pain medications, or other concerns related to your surgery.    If you experience any of these, please contact Dr. Gallardo's office   [phone] (ask for Judy Correa, Dr. Gallardo's nurse)

## 2023-12-21 NOTE — ANESTHESIA PROCEDURE NOTES
Intubation    Date/Time: 12/21/2023 9:20 AM    Performed by: Pepito Anders CRNA  Authorized by: Monica Elliott MD    Intubation:     Induction:  Intravenous    Intubated:  Postinduction    Mask Ventilation:  Easy mask    Attempts:  1    Attempted By:  CRNA    Method of Intubation:  Direct    Blade:  Nicole 2    Laryngeal View Grade: Grade I - full view of cords      Difficult Airway Encountered?: No      Complications:  None    Airway Device:  Oral endotracheal tube    Airway Device Size:  7.5    Style/Cuff Inflation:  Cuffed (inflated to minimal occlusive pressure)    Tube secured:  23    Secured at:  The lips    Placement Verified By:  Capnometry    Complicating Factors:  None    Findings Post-Intubation:  BS equal bilateral and atraumatic/condition of teeth unchanged

## 2023-12-21 NOTE — INTERVAL H&P NOTE
The patient has been examined and the H&P has been reviewed:    I concur with the findings and no changes have occurred since H&P was written.    Surgery risks, benefits and alternative options discussed and understood by patient/family.    Cordelia Gallardo MD

## 2023-12-21 NOTE — TRANSFER OF CARE
"Anesthesia Transfer of Care Note    Patient: Shavon Hahn    Procedure(s) Performed: Procedure(s) (LRB):  FIXATION, LIGAMENT, SACROSPINOUS  Bilateral (Bilateral)  CYSTOSCOPY, WITH PERIURETHRAL BULKING AGENT INJECTION (N/A)  COLPORRHAPHY, COMBINED ANTEROPOSTERIOR (N/A)    Patient location: PACU    Anesthesia Type: general    Transport from OR: Transported from OR on room air with adequate spontaneous ventilation    Post pain: adequate analgesia    Post assessment: no apparent anesthetic complications and tolerated procedure well    Post vital signs: stable    Level of consciousness: responds to stimulation and sedated    Nausea/Vomiting: no nausea/vomiting    Complications: none    Transfer of care protocol was followed      Last vitals: Visit Vitals  BP (!) 91/56 (BP Location: Right arm, Patient Position: Lying)   Pulse 81   Temp 36 °C (96.8 °F) (Skin)   Resp 20   Ht 5' 6" (1.676 m)   Wt 79.4 kg (175 lb 0.7 oz)   LMP  (LMP Unknown)   SpO2 100%   Breastfeeding No   BMI 28.25 kg/m²     "

## 2023-12-22 VITALS
OXYGEN SATURATION: 100 % | DIASTOLIC BLOOD PRESSURE: 75 MMHG | WEIGHT: 175.06 LBS | HEIGHT: 66 IN | BODY MASS INDEX: 28.14 KG/M2 | TEMPERATURE: 98 F | HEART RATE: 90 BPM | RESPIRATION RATE: 18 BRPM | SYSTOLIC BLOOD PRESSURE: 121 MMHG

## 2023-12-22 PROBLEM — N81.9 VAGINAL VAULT PROLAPSE: Status: ACTIVE | Noted: 2023-12-22

## 2023-12-22 LAB
BASOPHILS # BLD AUTO: 0.02 X10(3)/MCL
BASOPHILS NFR BLD AUTO: 0.2 %
EOSINOPHIL # BLD AUTO: 0.01 X10(3)/MCL (ref 0–0.9)
EOSINOPHIL NFR BLD AUTO: 0.1 %
ERYTHROCYTE [DISTWIDTH] IN BLOOD BY AUTOMATED COUNT: 13.2 % (ref 11.5–17)
HCT VFR BLD AUTO: 34 % (ref 37–47)
HGB BLD-MCNC: 10.4 G/DL (ref 12–16)
IMM GRANULOCYTES # BLD AUTO: 0.04 X10(3)/MCL (ref 0–0.04)
IMM GRANULOCYTES NFR BLD AUTO: 0.3 %
LYMPHOCYTES # BLD AUTO: 1.71 X10(3)/MCL (ref 0.6–4.6)
LYMPHOCYTES NFR BLD AUTO: 14 %
MCH RBC QN AUTO: 23.5 PG (ref 27–31)
MCHC RBC AUTO-ENTMCNC: 30.6 G/DL (ref 33–36)
MCV RBC AUTO: 76.7 FL (ref 80–94)
MONOCYTES # BLD AUTO: 0.96 X10(3)/MCL (ref 0.1–1.3)
MONOCYTES NFR BLD AUTO: 7.9 %
NEUTROPHILS # BLD AUTO: 9.44 X10(3)/MCL (ref 2.1–9.2)
NEUTROPHILS NFR BLD AUTO: 77.5 %
NRBC BLD AUTO-RTO: 0 %
PLATELET # BLD AUTO: 182 X10(3)/MCL (ref 130–400)
PMV BLD AUTO: 9.9 FL (ref 7.4–10.4)
RBC # BLD AUTO: 4.43 X10(6)/MCL (ref 4.2–5.4)
WBC # SPEC AUTO: 12.18 X10(3)/MCL (ref 4.5–11.5)

## 2023-12-22 PROCEDURE — 63600175 PHARM REV CODE 636 W HCPCS: Performed by: OBSTETRICS & GYNECOLOGY

## 2023-12-22 PROCEDURE — 25000003 PHARM REV CODE 250: Performed by: OBSTETRICS & GYNECOLOGY

## 2023-12-22 PROCEDURE — 85025 COMPLETE CBC W/AUTO DIFF WBC: CPT | Performed by: OBSTETRICS & GYNECOLOGY

## 2023-12-22 PROCEDURE — G0378 HOSPITAL OBSERVATION PER HR: HCPCS

## 2023-12-22 RX ORDER — ACETAMINOPHEN 325 MG/1
650 TABLET ORAL EVERY 6 HOURS
Qty: 30 TABLET | Refills: 0 | Status: SHIPPED | OUTPATIENT
Start: 2023-12-22 | End: 2023-12-25

## 2023-12-22 RX ORDER — TRAMADOL HYDROCHLORIDE 50 MG/1
50 TABLET ORAL EVERY 6 HOURS PRN
Qty: 12 TABLET | Refills: 0 | Status: SHIPPED | OUTPATIENT
Start: 2023-12-22 | End: 2024-01-04 | Stop reason: SDUPTHER

## 2023-12-22 RX ADMIN — KETOROLAC TROMETHAMINE 15 MG: 30 INJECTION, SOLUTION INTRAMUSCULAR; INTRAVENOUS at 06:12

## 2023-12-22 RX ADMIN — GABAPENTIN 100 MG: 100 CAPSULE ORAL at 09:12

## 2023-12-22 NOTE — PROGRESS NOTES
Pt was given discharge instructions, home medications, and follow up appointments. She was in stable condition and all questions were answered.

## 2023-12-22 NOTE — DISCHARGE SUMMARY
[facility] - 8th Floor Med Surg  Discharge Note  Short Stay    Procedure(s) (LRB):  FIXATION, LIGAMENT, SACROSPINOUS  Bilateral (Bilateral)  CYSTOSCOPY, WITH PERIURETHRAL BULKING AGENT INJECTION (N/A)  COLPORRHAPHY, COMBINED ANTEROPOSTERIOR (N/A)      OUTCOME: Patient tolerated treatment/procedure well without complication and is now ready for discharge.    DISPOSITION: Home or Self Care    FINAL DIAGNOSIS:  Vaginal vault prolapse    FOLLOWUP: In clinic    DISCHARGE INSTRUCTIONS:  No discharge procedures on file.     TIME SPENT ON DISCHARGE: 10 minutes

## 2023-12-22 NOTE — PLAN OF CARE
DAHLIA Pinzon completed with daughter, Emelina, over the phone. Email sent to [employer] for delivery.

## 2023-12-22 NOTE — PROGRESS NOTES
[facility] - 8th Floor Med Surg  Female Pelvic Medicine and Reconstructive Surgery/Urogynecology  Progress Note    Subjective:     Interval History: Doing well. No complaints. Eating a regular diet. Pain controlled    Post-Op Info:  Procedure(s) (LRB):  FIXATION, LIGAMENT, SACROSPINOUS  Bilateral (Bilateral)  CYSTOSCOPY  COLPORRHAPHY, COMBINED ANTEROPOSTERIOR (N/A)   1 Day Post-Op      Medications:  Continuous Infusions:   lactated ringers 50 mL/hr at 12/21/23 1701     Scheduled Meds:   gabapentin  100 mg Oral TID    ketorolac  15 mg Intravenous Q6H     PRN Meds:diphenhydrAMINE, morphine, ondansetron, traMADoL     Objective:     Vital Signs (Most Recent):  Temp: 97.7 °F (36.5 °C) (12/22/23 1131)  Pulse: 90 (12/22/23 1131)  Resp: 18 (12/22/23 1131)  BP: 121/75 (12/22/23 1131)  SpO2: 100 % (12/22/23 1131) Vital Signs (24h Range):  Temp:  [96.8 °F (36 °C)-98.1 °F (36.7 °C)] 97.7 °F (36.5 °C)  Pulse:  [61-90] 90  Resp:  [10-20] 18  SpO2:  [85 %-100 %] 100 %  BP: ()/(55-75) 121/75       Intake/Output Summary (Last 24 hours) at 12/22/2023 1131  Last data filed at 12/22/2023 0600  Gross per 24 hour   Intake 810 ml   Output 1200 ml   Net -390 ml       Physical Exam  Constitutional:       Appearance: Normal appearance.   HENT:      Head: Normocephalic.      Mouth/Throat:      Mouth: Mucous membranes are moist.   Eyes:      Conjunctiva/sclera: Conjunctivae normal.   Cardiovascular:      Rate and Rhythm: Normal rate and regular rhythm.      Pulses: Normal pulses.   Pulmonary:      Effort: Pulmonary effort is normal.   Abdominal:      General: Bowel sounds are normal. There is no distension.      Palpations: Abdomen is soft.   Musculoskeletal:      Cervical back: Normal range of motion.   Skin:     General: Skin is warm.      Capillary Refill: Capillary refill takes less than 2 seconds.   Neurological:      Mental Status: Mental status is at baseline.   Psychiatric:         Mood and Affect: Mood normal.          Behavior: Behavior normal.         Significant Labs:  CBC:   Recent Labs   Lab 12/22/23  0537   WBC 12.18*   RBC 4.43   HGB 10.4*   HCT 34.0*      MCV 76.7*   MCH 23.5*   MCHC 30.6*       Significant Diagnostics:  None    Assessment/Plan:     Active Diagnoses:    Diagnosis Date Noted POA    PRINCIPAL PROBLEM:  Vaginal vault prolapse [N81.9] 12/22/2023 Yes      Problems Resolved During this Admission:     - Okay to discharge today    Cordelia Gallardo MD  Female Pelvic Medicine and Reconstructive Surgery/Urogynecology  [facility] - 8th Floor Med Surg

## 2023-12-27 ENCOUNTER — PATIENT OUTREACH (OUTPATIENT)
Dept: ADMINISTRATIVE | Facility: CLINIC | Age: 68
End: 2023-12-27
Payer: MEDICARE

## 2023-12-27 RX ORDER — ACETAMINOPHEN 500 MG
1000 TABLET ORAL
COMMUNITY

## 2023-12-27 NOTE — PROGRESS NOTES
C3 nurse spoke with Shavon Hahn  for a TCC post hospital discharge follow up call. The patient has a scheduled HOS appointment with Dr. Cordelia Gallardo on 01/04/2024 @ 1020 am. After speaking with patient requested to call daughter Emelina to verify appointment. Spoke with Emelina. Verified patient's medications she's taking and appointment. Stated she will call Allyson Brown MD to make aware of patient's discharge.

## 2024-01-04 ENCOUNTER — OFFICE VISIT (OUTPATIENT)
Dept: UROGYNECOLOGY | Facility: CLINIC | Age: 69
End: 2024-01-04
Payer: MEDICARE

## 2024-01-04 VITALS — DIASTOLIC BLOOD PRESSURE: 72 MMHG | HEART RATE: 71 BPM | SYSTOLIC BLOOD PRESSURE: 108 MMHG

## 2024-01-04 DIAGNOSIS — N95.8 GENITOURINARY SYNDROME OF MENOPAUSE: ICD-10-CM

## 2024-01-04 DIAGNOSIS — N99.3 COMPLETE PROLAPSE OF VAGINAL VAULT: ICD-10-CM

## 2024-01-04 DIAGNOSIS — Z98.890 POST-OPERATIVE STATE: Primary | ICD-10-CM

## 2024-01-04 DIAGNOSIS — R33.9 INCOMPLETE BLADDER EMPTYING: ICD-10-CM

## 2024-01-04 PROCEDURE — 3074F SYST BP LT 130 MM HG: CPT | Mod: CPTII,S$GLB,, | Performed by: OBSTETRICS & GYNECOLOGY

## 2024-01-04 PROCEDURE — 1159F MED LIST DOCD IN RCRD: CPT | Mod: CPTII,S$GLB,, | Performed by: OBSTETRICS & GYNECOLOGY

## 2024-01-04 PROCEDURE — 1101F PT FALLS ASSESS-DOCD LE1/YR: CPT | Mod: CPTII,S$GLB,, | Performed by: OBSTETRICS & GYNECOLOGY

## 2024-01-04 PROCEDURE — 3078F DIAST BP <80 MM HG: CPT | Mod: CPTII,S$GLB,, | Performed by: OBSTETRICS & GYNECOLOGY

## 2024-01-04 PROCEDURE — 3288F FALL RISK ASSESSMENT DOCD: CPT | Mod: CPTII,S$GLB,, | Performed by: OBSTETRICS & GYNECOLOGY

## 2024-01-04 PROCEDURE — 99024 POSTOP FOLLOW-UP VISIT: CPT | Mod: S$GLB,,, | Performed by: OBSTETRICS & GYNECOLOGY

## 2024-01-04 RX ORDER — SULFAMETHOXAZOLE AND TRIMETHOPRIM 800; 160 MG/1; MG/1
1 TABLET ORAL 2 TIMES DAILY
Qty: 10 TABLET | Refills: 0 | Status: SHIPPED | OUTPATIENT
Start: 2024-01-04 | End: 2024-01-09

## 2024-01-04 RX ORDER — GABAPENTIN 300 MG/1
300 CAPSULE ORAL DAILY
Qty: 30 CAPSULE | Refills: 0 | Status: SHIPPED | OUTPATIENT
Start: 2024-01-04 | End: 2024-02-21 | Stop reason: SDUPTHER

## 2024-01-04 RX ORDER — TRAMADOL HYDROCHLORIDE 50 MG/1
50 TABLET ORAL EVERY 6 HOURS PRN
Qty: 12 TABLET | Refills: 0 | Status: SHIPPED | OUTPATIENT
Start: 2024-01-04

## 2024-01-04 NOTE — PROGRESS NOTES
PELVIC MEDICINE AND RECONSTRUCTIVE SURGERY    Chief complaint: Post op check    Subjective: Shavon Hahn is a 68 y.o. female Patient is s/p   FIXATION, LIGAMENT, SACROSPINOUS (Bilateral)  CYSTOSCOPY  COLPORRHAPHY, COMBINED ANTEROPOSTERIOR (N/A)     PERINEOPLASTY (N/A)   on 12/21/2023 at [facility] for bothersome POP/CATRACHITA  Procedure was uncomplicated.     Patient reports:   Pain: Yes: Occasionally. She has 1 tramadol tablet left.  She takes Tylenol as well as needed  Vaginal bleeding: No  Requiring narcotics: Yes: she has 1 tablet left  Fever/Chills: No  Nausea/Vomiting: No  Voiding: Yes  Constipation: Yes  Tolerating po: Yes  Ambulating: Yes  Back to daily activities: No  Vaginal bulge: No  Urinary incontinence: No    Pathology reviewed: N/A    Exam:  Vitals:    01/04/24 1032   BP: 108/72   Pulse: 71     General: No acute distress   Skin: no lesions  Musculoskeletal: normal lower extremity strength  Sensation to light touch in the lower extremities is normal   Extremities: well perfused with normal pulses in the distal extremities, no peripheral edema noted  Abdominal exam: soft non tender, no palpable masses  External genitalia: normal female genitalia, no lesions, normal female hair distribution, no clitoral enlargement, nontender, no scars  Chaperone: Judy Correa LPN  Speculum exam:   Vagina: normal vagina, discharge present, no lesions, sutures present  BME: Non-tender, no masses, sutures present  Ext: no edema. Ambulating well.    Assessment: Shavon Hahn is a 68 y.o. female s/p   FIXATION, LIGAMENT, SACROSPINOUS (Bilateral)  CYSTOSCOPY  COLPORRHAPHY, COMBINED ANTEROPOSTERIOR (N/A)     PERINEOPLASTY (N/A)   on 12/21/2023 doing well and recovering appropriately  2 weeks post op     1. Post-operative state    2. Complete prolapse of vaginal vault    3. Incomplete bladder emptying    4. Genitourinary syndrome of menopause       Plan:     Doing well.  Occasional pain  Discharge present, will treat with  Bactrim  No bleeding  Pelvic rest  No heavy lifting 4-6 weeks     PATRICK Gallardo MD  Pelvic Medicine and Reconstructive Surgery  Urogynecology  [facility]

## 2024-01-19 ENCOUNTER — TELEPHONE (OUTPATIENT)
Dept: UROGYNECOLOGY | Facility: CLINIC | Age: 69
End: 2024-01-19
Payer: MEDICARE

## 2024-01-19 NOTE — TELEPHONE ENCOUNTER
Spoke to patient  Daughter and let her know that Dr. Gallardo is no longer seeing patients at [facility]. We have a Provider from [facility] coming to continue care for our patients. Once we know more about their schedule we will giver her a call back to reschedule. She verbalized understanding.

## 2024-02-15 NOTE — PROGRESS NOTES
[facility]  [address], [address]  [place] [address]  February 15, 2024     Shavon Hahn  [id number]  1955    UROGYN POST-OP  2/15/2024     Shavon Hahn is a here for a post operative visit.    OPERATIVE NOTE  Date of Procedure: 12/21/2023      Procedure: Procedure(s) (LRB):  FIXATION, LIGAMENT, SACROSPINOUS  Bilateral (Bilateral)  CYSTOSCOPY  COLPORRHAPHY, COMBINED ANTEROPOSTERIOR (N/A)      01/04/2024  Patient reports:   Pain: Yes: Occasionally. She has 1 tramadol tablet left.  She takes Tylenol as well as needed  Vaginal bleeding: No  Requiring narcotics: Yes: she has 1 tablet left  Fever/Chills: No  Nausea/Vomiting: No  Voiding: Yes  Constipation: Yes  Tolerating po: Yes  Ambulating: Yes  Back to daily activities: No  Vaginal bulge: No  Urinary incontinence: No    HISTORY SINCE LAST VISIT:  Mild vaginal pain.  Denies discharge  or bleeding.   Bladder issues: rare UUI.  + nocturia 4/ night with urgency. Using 4 pads/ day with minimal wetness.   Bowel issues: intermittent constipation -- denies straining . Takes mild of magnesia 1-2 times/ week if needed    Past Medical History:   Diagnosis Date    Essential (primary) hypertension     GERD without esophagitis     Hypercholesterolemia     Left-sided weakness     Schizoaffective disorder     Use of cane as ambulatory aid     Vaginal vault prolapse     Walker as ambulation aid        Past Surgical History:   Procedure Laterality Date    CATARACT EXTRACTION Bilateral     COLPORRHAPHY, COMBINED ANTEROPOSTERIOR N/A 12/21/2023    Procedure: COLPORRHAPHY, COMBINED ANTEROPOSTERIOR;  Surgeon: Cordelia Gallardo MD;  Location: [facility];  Service: OB/GYN;  Laterality: N/A;  A&P REPAIR    CYSTOSCOPY WITH INJECTION OF PERIURETHRAL BULKING AGENT N/A 12/21/2023    Procedure: CYSTOSCOPY, WITH PERIURETHRAL BULKING AGENT INJECTION;  Surgeon: Cordelia Gallardo MD;  Location: [facility];  Service: OB/GYN;  Laterality: N/A;  Bulkamid 2 boxes  with Bulkamid scope //  EXAM UNDER ANESTHESIA // CYSTOSCOPY // PERINEOPLASTY //  CAPIO SLIM    SACROSPINOUS LIGAMENT FIXATION Bilateral 12/21/2023    Procedure: FIXATION, LIGAMENT, SACROSPINOUS  Bilateral;  Surgeon: Cordelia Gallardo MD;  Location: [facility];  Service: OB/GYN;  Laterality: Bilateral;  Capio slim    THROMBECTOMY  2020    VAGINAL HYSTERECTOMY  1981       No family history on file.    Social History     Socioeconomic History    Marital status:    Tobacco Use    Smoking status: Never    Smokeless tobacco: Never   Substance and Sexual Activity    Alcohol use: Never    Drug use: Never    Sexual activity: Not Currently       Current Outpatient Medications   Medication Sig Dispense Refill    acetaminophen (TYLENOL) 500 MG tablet Take 1,000 mg by mouth as needed for Pain.      aspirin (ECOTRIN) 81 MG EC tablet Take 81 mg by mouth once daily.      atorvastatin calcium (ATORVASTATIN ORAL)   0 Refill(s), Start Date: 09/22/22 11:49:00 CDT      estradioL (ESTRACE) 0.01 % (0.1 mg/gram) vaginal cream Use a pea sized amount to the vagina once a night for 14 nights when initiating the medication, then 2-3 times per week.  DO NOT use the applicator (Patient not taking: Reported on 12/27/2023) 42.5 g 10    gabapentin (NEURONTIN) 300 MG capsule Take 1 capsule (300 mg total) by mouth once daily. 30 capsule 0    haloperidoL (HALDOL) 5 MG tablet   0 Refill(s), Start Date: 09/22/22 11:51:00 CDT      meloxicam (MOBIC) 7.5 MG tablet   0 Refill(s), Start Date: 09/22/22 11:51:00 CDT      metoprolol tartrate (LOPRESSOR) 50 MG tablet   0 Refill(s), Start Date: 09/22/22 11:50:00 CDT      montelukast (SINGULAIR) 10 mg tablet   0 Refill(s), Start Date: 09/22/22 11:51:00 CDT      pantoprazole (PROTONIX) 40 MG tablet Take 40 mg by mouth every morning.      potassium chloride (MICRO-K) 10 MEQ CpSR   0 Refill(s), Start Date: 09/22/22 11:50:00 CDT      spironolactone (ALDACTONE) 50 MG tablet Take 50 mg by mouth.      traMADoL  (ULTRAM) 50 mg tablet Take 1 tablet (50 mg total) by mouth every 6 (six) hours as needed for Pain. 12 tablet 0     No current facility-administered medications for this visit.       Review of patient's allergies indicates:   Allergen Reactions    Esomeprazole magnesium     Codeine Other (See Comments)     Headaches and Dizziness         ROS  Per HPI        Physical Exam  LMP  (LMP Unknown)   General: alert and oriented, no acute distress  Respiratory: normal respiratory effort  Abd: soft, non-tender, non-distended  Pelvic:  Ext. Genitalia: normal external genitalia. Normal bartholin's and skeens glands  Vagina: + atrophy. Normal vaginal mucosa without lesions. No discharge noted.   Non-tender bladder base without palpable mass.  Large pds suture noted at R vaginal cuff.  Does not bother patient.   Cervix: absent  Uterus:  absent   Urethra: no masses or tenderness  Urethral meatus: no lesions, caruncle or prolapse.    Dr. Pat present during exam    Impression  No diagnosis found.  We reviewed the above issues and discussed options for short-term versus long-term management of her problems.     Plan:   Post op healing well. Continue to follow post op instructions.  Use vaginal estrogen cream ----use dime size amount in vagina-- insert to your second knuckle and rub around just inside vaginal opening  twice weekly  Start miralax 1 capful daily--stiop if stools are too loose  Patient will follow up with us in 4-6 weeks for follow up    20 minutes were spent in face to face time with this patient  90 % of this time was spent in counseling and/or coordination of care    Nisa Meadows, [facility]  Division of Female Pelvic Medicine and Reconstructive Surgery  Department of Obstetrics & Gynecology

## 2024-02-16 ENCOUNTER — OFFICE VISIT (OUTPATIENT)
Dept: UROGYNECOLOGY | Facility: CLINIC | Age: 69
End: 2024-02-16
Payer: MEDICARE

## 2024-02-16 VITALS
SYSTOLIC BLOOD PRESSURE: 116 MMHG | WEIGHT: 180.25 LBS | HEART RATE: 64 BPM | BODY MASS INDEX: 30.03 KG/M2 | HEIGHT: 65 IN | DIASTOLIC BLOOD PRESSURE: 76 MMHG

## 2024-02-16 DIAGNOSIS — K59.00 CONSTIPATION, UNSPECIFIED CONSTIPATION TYPE: ICD-10-CM

## 2024-02-16 DIAGNOSIS — Z98.890 POST-OPERATIVE STATE: Primary | ICD-10-CM

## 2024-02-16 DIAGNOSIS — N95.2 VAGINAL ATROPHY: ICD-10-CM

## 2024-02-16 DIAGNOSIS — N95.8 GENITOURINARY SYNDROME OF MENOPAUSE: ICD-10-CM

## 2024-02-16 DIAGNOSIS — N39.41 URGE INCONTINENCE: ICD-10-CM

## 2024-02-16 PROCEDURE — 3288F FALL RISK ASSESSMENT DOCD: CPT | Mod: CPTII,S$GLB,, | Performed by: NURSE PRACTITIONER

## 2024-02-16 PROCEDURE — 1101F PT FALLS ASSESS-DOCD LE1/YR: CPT | Mod: CPTII,S$GLB,, | Performed by: NURSE PRACTITIONER

## 2024-02-16 PROCEDURE — 1159F MED LIST DOCD IN RCRD: CPT | Mod: CPTII,S$GLB,, | Performed by: NURSE PRACTITIONER

## 2024-02-16 PROCEDURE — 3074F SYST BP LT 130 MM HG: CPT | Mod: CPTII,S$GLB,, | Performed by: NURSE PRACTITIONER

## 2024-02-16 PROCEDURE — 99024 POSTOP FOLLOW-UP VISIT: CPT | Mod: S$GLB,,, | Performed by: NURSE PRACTITIONER

## 2024-02-16 PROCEDURE — 3078F DIAST BP <80 MM HG: CPT | Mod: CPTII,S$GLB,, | Performed by: NURSE PRACTITIONER

## 2024-02-16 PROCEDURE — 87086 URINE CULTURE/COLONY COUNT: CPT | Performed by: NURSE PRACTITIONER

## 2024-02-16 PROCEDURE — 1160F RVW MEDS BY RX/DR IN RCRD: CPT | Mod: CPTII,S$GLB,, | Performed by: NURSE PRACTITIONER

## 2024-02-16 RX ORDER — VIBEGRON 75 MG/1
75 TABLET, FILM COATED ORAL DAILY
Qty: 30 TABLET | Refills: 11 | Status: SHIPPED | OUTPATIENT
Start: 2024-02-16

## 2024-02-16 RX ORDER — POLYETHYLENE GLYCOL 3350 17 G/17G
17 POWDER, FOR SOLUTION ORAL DAILY
Qty: 30 PACKET | Refills: 11 | Status: SHIPPED | OUTPATIENT
Start: 2024-02-16 | End: 2024-04-19 | Stop reason: SDUPTHER

## 2024-02-16 RX ORDER — ESTRADIOL 0.1 MG/G
1 CREAM VAGINAL
Qty: 42.5 G | Refills: 10 | Status: SHIPPED | OUTPATIENT
Start: 2024-02-19

## 2024-02-18 LAB — BACTERIA UR CULT: NO GROWTH

## 2024-02-19 ENCOUNTER — TELEPHONE (OUTPATIENT)
Dept: UROGYNECOLOGY | Facility: CLINIC | Age: 69
End: 2024-02-19
Payer: MEDICARE

## 2024-02-19 NOTE — TELEPHONE ENCOUNTER
----- Message from Nisa Meadows NP sent at 2/19/2024  8:34 AM CST -----  Please let patient know her urine culture was negative.     Thanks,  Nisa

## 2024-02-19 NOTE — TELEPHONE ENCOUNTER
Left voice message for pt to give the office a call back at [phone]. Per NP PARESH Meadows, Please let patient know her urine culture was negative.

## 2024-02-21 ENCOUNTER — TELEPHONE (OUTPATIENT)
Dept: UROGYNECOLOGY | Facility: CLINIC | Age: 69
End: 2024-02-21
Payer: MEDICARE

## 2024-02-21 ENCOUNTER — TELEPHONE (OUTPATIENT)
Dept: UROGYNECOLOGY | Facility: CLINIC | Age: 69
End: 2024-02-21

## 2024-02-21 DIAGNOSIS — Z98.890 POST-OPERATIVE STATE: ICD-10-CM

## 2024-02-21 RX ORDER — GABAPENTIN 300 MG/1
300 CAPSULE ORAL DAILY
Qty: 30 CAPSULE | Refills: 2 | Status: SHIPPED | OUTPATIENT
Start: 2024-02-21 | End: 2025-02-20

## 2024-02-21 NOTE — TELEPHONE ENCOUNTER
----- Message from Judy Correa LPN sent at 2/21/2024  9:32 AM CST -----  Regarding: Medication  Patient called and stated that the Gabapentin Dr. Gallardo had prescribed needs a refilled. Would you like to refill this for her?

## 2024-02-21 NOTE — TELEPHONE ENCOUNTER
Spoke to patient's daughter and informed her that a refill for gabapentin has been sent to their pharmacy. She verbalized understanding.

## 2024-04-19 ENCOUNTER — OFFICE VISIT (OUTPATIENT)
Dept: UROGYNECOLOGY | Facility: CLINIC | Age: 69
End: 2024-04-19
Payer: MEDICARE

## 2024-04-19 VITALS
WEIGHT: 182.31 LBS | SYSTOLIC BLOOD PRESSURE: 118 MMHG | TEMPERATURE: 98 F | HEART RATE: 69 BPM | HEIGHT: 65 IN | BODY MASS INDEX: 30.37 KG/M2 | DIASTOLIC BLOOD PRESSURE: 77 MMHG

## 2024-04-19 DIAGNOSIS — N95.2 VAGINAL ATROPHY: Primary | ICD-10-CM

## 2024-04-19 DIAGNOSIS — K59.00 CONSTIPATION, UNSPECIFIED CONSTIPATION TYPE: ICD-10-CM

## 2024-04-19 PROCEDURE — 3008F BODY MASS INDEX DOCD: CPT | Mod: CPTII,S$GLB,, | Performed by: NURSE PRACTITIONER

## 2024-04-19 PROCEDURE — 3078F DIAST BP <80 MM HG: CPT | Mod: CPTII,S$GLB,, | Performed by: NURSE PRACTITIONER

## 2024-04-19 PROCEDURE — 3074F SYST BP LT 130 MM HG: CPT | Mod: CPTII,S$GLB,, | Performed by: NURSE PRACTITIONER

## 2024-04-19 PROCEDURE — 99213 OFFICE O/P EST LOW 20 MIN: CPT | Mod: S$GLB,,, | Performed by: NURSE PRACTITIONER

## 2024-04-19 PROCEDURE — 1160F RVW MEDS BY RX/DR IN RCRD: CPT | Mod: CPTII,S$GLB,, | Performed by: NURSE PRACTITIONER

## 2024-04-19 PROCEDURE — 1159F MED LIST DOCD IN RCRD: CPT | Mod: CPTII,S$GLB,, | Performed by: NURSE PRACTITIONER

## 2024-04-19 RX ORDER — POTASSIUM CHLORIDE 750 MG/1
10 TABLET, EXTENDED RELEASE ORAL
COMMUNITY
Start: 2024-04-09

## 2024-04-19 RX ORDER — LEVOCETIRIZINE DIHYDROCHLORIDE 5 MG/1
5 TABLET, FILM COATED ORAL
COMMUNITY

## 2024-04-19 RX ORDER — AMLODIPINE BESYLATE 5 MG/1
5 TABLET ORAL
COMMUNITY

## 2024-04-19 RX ORDER — FUROSEMIDE 20 MG/1
20 TABLET ORAL
COMMUNITY
Start: 2024-04-09

## 2024-04-19 RX ORDER — POLYETHYLENE GLYCOL 3350 17 G/17G
17 POWDER, FOR SOLUTION ORAL DAILY
Qty: 30 PACKET | Refills: 11 | Status: SHIPPED | OUTPATIENT
Start: 2024-04-19

## 2024-04-19 RX ORDER — FAMOTIDINE 40 MG/1
40 TABLET, FILM COATED ORAL NIGHTLY
COMMUNITY

## 2024-04-19 RX ORDER — METHOCARBAMOL 750 MG/1
750 TABLET, FILM COATED ORAL EVERY 8 HOURS
COMMUNITY
Start: 2024-01-22

## 2024-04-19 RX ORDER — ESOMEPRAZOLE MAGNESIUM 40 MG/1
40 CAPSULE, DELAYED RELEASE ORAL
COMMUNITY
Start: 2024-04-09

## 2024-04-19 RX ORDER — AZELASTINE 1 MG/ML
1 SPRAY, METERED NASAL
COMMUNITY

## 2024-04-19 RX ORDER — ERGOCALCIFEROL 1.25 MG/1
50000 CAPSULE ORAL
COMMUNITY

## 2024-04-19 NOTE — PROGRESS NOTES
[facility]  [address], [address]  [place] [address]  April 30, 2024     Shavon Hahn  [id number]  1955    UROGYN POST-OP  4/30/2024     Shavon Hahn is a here for a post operative visit.    OPERATIVE NOTE  Date of Procedure: 12/21/2023      Procedure: Procedure(s) (LRB):  FIXATION, LIGAMENT, SACROSPINOUS  Bilateral (Bilateral)  CYSTOSCOPY  COLPORRHAPHY, COMBINED ANTEROPOSTERIOR (N/A)      01/04/2024  Patient reports:   Pain: Yes: Occasionally. She has 1 tramadol tablet left.  She takes Tylenol as well as needed  Vaginal bleeding: No  Requiring narcotics: Yes: she has 1 tablet left  Fever/Chills: No  Nausea/Vomiting: No  Voiding: Yes  Constipation: Yes  Tolerating po: Yes  Ambulating: Yes  Back to daily activities: No  Vaginal bulge: No  Urinary incontinence: No    02/16/2024  Mild vaginal pain.  Denies discharge  or bleeding.   Bladder issues: rare UUI.  + nocturia 4/ night with urgency. Using 4 pads/ day with minimal wetness.   Bowel issues: intermittent constipation -- denies straining . Takes milk of magnesia 1-2 times/ week if needed    Changes since last visit:  Scant tan discharge. Has been using vaginal estrogen cream  Denies UI. Nocturia 2/ night. Wears 2 liner pads together-- changes twice daily--mostly dry  + constipation--did not start miralax    Past Medical History:   Diagnosis Date    Essential (primary) hypertension     GERD without esophagitis     Hypercholesterolemia     Left-sided weakness     Schizoaffective disorder     Use of cane as ambulatory aid     Vaginal vault prolapse     Walker as ambulation aid        Past Surgical History:   Procedure Laterality Date    CATARACT EXTRACTION Bilateral     COLPORRHAPHY, COMBINED ANTEROPOSTERIOR N/A 12/21/2023    Procedure: COLPORRHAPHY, COMBINED ANTEROPOSTERIOR;  Surgeon: Cordelia Gallardo MD;  Location: [facility];  Service: OB/GYN;  Laterality: N/A;  A&P REPAIR    CYSTOSCOPY WITH INJECTION OF PERIURETHRAL  BULKING AGENT N/A 12/21/2023    Procedure: CYSTOSCOPY, WITH PERIURETHRAL BULKING AGENT INJECTION;  Surgeon: Cordelia Gallardo MD;  Location: [facility] OR;  Service: OB/GYN;  Laterality: N/A;  Bulkamid 2 boxes with Bulkamid scope //  EXAM UNDER ANESTHESIA // CYSTOSCOPY // PERINEOPLASTY //  CAPIO SLIM    SACROSPINOUS LIGAMENT FIXATION Bilateral 12/21/2023    Procedure: FIXATION, LIGAMENT, SACROSPINOUS  Bilateral;  Surgeon: Cordelia Gallardo MD;  Location: [facility] OR;  Service: OB/GYN;  Laterality: Bilateral;  Capio slim    THROMBECTOMY  2020    VAGINAL HYSTERECTOMY  1981       No family history on file.    Social History     Socioeconomic History    Marital status:    Tobacco Use    Smoking status: Never    Smokeless tobacco: Never   Substance and Sexual Activity    Alcohol use: Never    Drug use: Never    Sexual activity: Not Currently       Current Outpatient Medications   Medication Sig Dispense Refill    acetaminophen (TYLENOL) 500 MG tablet Take 1,000 mg by mouth as needed for Pain.      amLODIPine (NORVASC) 5 MG tablet Take 5 mg by mouth.      aspirin (ECOTRIN) 81 MG EC tablet Take 81 mg by mouth once daily.      atorvastatin calcium (ATORVASTATIN ORAL)   0 Refill(s), Start Date: 09/22/22 11:49:00 CDT      azelastine (ASTELIN) 137 mcg (0.1 %) nasal spray 1 spray by Nasal route.      ergocalciferol (ERGOCALCIFEROL) 50,000 unit Cap Take 50,000 Units by mouth.      esomeprazole (NEXIUM) 40 MG capsule Take 40 mg by mouth.      estradioL (ESTRACE) 0.01 % (0.1 mg/gram) vaginal cream Place 1 g vaginally twice a week. Use a pea sized amount to the vagina once a night for 14 nights when initiating the medication, then 2-3 times per week.  DO NOT use the applicator 42.5 g 10    famotidine (PEPCID) 40 MG tablet Take 40 mg by mouth every evening.      furosemide (LASIX) 20 MG tablet Take 20 mg by mouth.      gabapentin (NEURONTIN) 300 MG capsule Take 1 capsule (300 mg total) by mouth once daily. 30 capsule 2    haloperidoL  "(HALDOL) 5 MG tablet   0 Refill(s), Start Date: 09/22/22 11:51:00 CDT      levocetirizine (XYZAL) 5 MG tablet Take 5 mg by mouth.      meloxicam (MOBIC) 7.5 MG tablet   0 Refill(s), Start Date: 09/22/22 11:51:00 CDT      methocarbamoL (ROBAXIN) 750 MG Tab Take 750 mg by mouth every 8 (eight) hours.      metoprolol tartrate (LOPRESSOR) 50 MG tablet   0 Refill(s), Start Date: 09/22/22 11:50:00 CDT      montelukast (SINGULAIR) 10 mg tablet   0 Refill(s), Start Date: 09/22/22 11:51:00 CDT      pantoprazole (PROTONIX) 40 MG tablet Take 40 mg by mouth every morning.      potassium chloride (KLOR-CON) 10 MEQ TbSR Take 10 mEq by mouth.      spironolactone (ALDACTONE) 50 MG tablet Take 50 mg by mouth.      vibegron (GEMTESA) 75 mg Tab Take 75 mg by mouth once daily. 30 tablet 11    polyethylene glycol (GLYCOLAX) 17 gram PwPk Take 17 g by mouth once daily. 30 packet 11     No current facility-administered medications for this visit.       Review of patient's allergies indicates:   Allergen Reactions    Esomeprazole magnesium      Severe headache, pressure     Codeine Other (See Comments)     Headaches and Dizziness         ROS  Per HPI        Physical Exam  /77 (BP Location: Left arm, Patient Position: Sitting, BP Method: Large (Automatic))   Pulse 69   Temp 97.7 °F (36.5 °C) (Oral)   Ht 5' 5" (1.651 m)   Wt 82.7 kg (182 lb 5.1 oz)   LMP  (LMP Unknown)   BMI 30.34 kg/m²   General: alert and oriented, no acute distress  Respiratory: normal respiratory effort  Abd: soft, non-tender, non-distended  Pelvic:  Ext. Genitalia: normal external genitalia. Normal bartholin's and skeens glands  Vagina: + atrophy. Normal vaginal mucosa without lesions. No discharge noted.   Non-tender bladder base without palpable mass.  One small pds suture noted at R vaginal cuff, non tender.  Does not bother patient.   Cervix: absent  Uterus:  absent   Urethra: no masses or tenderness  Urethral meatus: no lesions, caruncle or " prolapse.        Impression  1. Vaginal atrophy        2. Constipation, unspecified constipation type  polyethylene glycol (GLYCOLAX) 17 gram PwPk        We reviewed the above issues and discussed options for short-term versus long-term management of her problems.     Plan:   Post op healing well. Continue to follow post op instructions.  Use vaginal estrogen cream ----use dime size amount in vagina-- insert to your second knuckle and rub around just inside vaginal opening  twice weekly  Start miralax 1 capful daily--stop if stools are too loose  Patient will follow up with us in 4-6 weeks for follow up    I spent a total of 20 minutes on the day of the visit.  This includes face to face time and non-face to face time preparing to see the patient (eg, review of tests), obtaining and/or reviewing separately obtained history, documenting clinical information in the electronic or other health record, independently interpreting results and communicating results to the patient/family/caregiver, or care coordinator.     Nisa Meadows, FARIDAP-BC  [facility]  Division of Female Pelvic Medicine and Reconstructive Surgery  Department of Obstetrics & Gynecology

## 2024-04-19 NOTE — PATIENT INSTRUCTIONS
Post op healing well. Continue to follow post op instructions.  Use vaginal estrogen cream ----use dime size amount in vagina-- insert to your second knuckle and rub around just inside vaginal opening  twice weekly  Start miralax 1 capful daily--stop if stools are too loose  Patient will follow up with us in 12/2024

## 2024-10-28 ENCOUNTER — OFFICE VISIT (OUTPATIENT)
Dept: UROGYNECOLOGY | Facility: CLINIC | Age: 69
End: 2024-10-28
Payer: MEDICARE

## 2024-10-28 VITALS
SYSTOLIC BLOOD PRESSURE: 108 MMHG | HEIGHT: 65 IN | WEIGHT: 190.25 LBS | BODY MASS INDEX: 31.7 KG/M2 | DIASTOLIC BLOOD PRESSURE: 76 MMHG

## 2024-10-28 DIAGNOSIS — N95.2 VAGINAL ATROPHY: Primary | ICD-10-CM

## 2024-10-28 DIAGNOSIS — K59.00 CONSTIPATION, UNSPECIFIED CONSTIPATION TYPE: ICD-10-CM

## 2024-10-28 PROCEDURE — 3288F FALL RISK ASSESSMENT DOCD: CPT | Mod: CPTII,S$GLB,, | Performed by: NURSE PRACTITIONER

## 2024-10-28 PROCEDURE — 1159F MED LIST DOCD IN RCRD: CPT | Mod: CPTII,S$GLB,, | Performed by: NURSE PRACTITIONER

## 2024-10-28 PROCEDURE — 1101F PT FALLS ASSESS-DOCD LE1/YR: CPT | Mod: CPTII,S$GLB,, | Performed by: NURSE PRACTITIONER

## 2024-10-28 PROCEDURE — 99213 OFFICE O/P EST LOW 20 MIN: CPT | Mod: S$GLB,,, | Performed by: NURSE PRACTITIONER

## 2024-10-28 PROCEDURE — 3078F DIAST BP <80 MM HG: CPT | Mod: CPTII,S$GLB,, | Performed by: NURSE PRACTITIONER

## 2024-10-28 PROCEDURE — 1160F RVW MEDS BY RX/DR IN RCRD: CPT | Mod: CPTII,S$GLB,, | Performed by: NURSE PRACTITIONER

## 2024-10-28 PROCEDURE — 3074F SYST BP LT 130 MM HG: CPT | Mod: CPTII,S$GLB,, | Performed by: NURSE PRACTITIONER

## 2024-10-28 PROCEDURE — 3008F BODY MASS INDEX DOCD: CPT | Mod: CPTII,S$GLB,, | Performed by: NURSE PRACTITIONER

## 2024-10-28 NOTE — PATIENT INSTRUCTIONS
No lifting > 50 pounds  Use vaginal estrogen cream ----use dime size amount in vagina-- insert to your second knuckle and rub around just inside vaginal opening  twice weekly  Take miralax 1/2-1 capful daily--stop if stools are too loose--need to avoid straining  Patient will follow up with us in 6 months for follow up

## 2024-10-28 NOTE — PROGRESS NOTES
[facility]  [address], [address]  [place] [address]  November 11, 2024     Shavon Hahn  [id number]  1955    UROGYN POST-OP  11/11/2024     Shavon Hahn is a here for a urogyn follow up of vaginal atrophy s/p SSF    OPERATIVE NOTE  Date of Procedure: 12/21/2023      Procedure: Procedure(s) (LRB):  FIXATION, LIGAMENT, SACROSPINOUS  Bilateral (Bilateral)  CYSTOSCOPY  COLPORRHAPHY, COMBINED ANTEROPOSTERIOR (N/A)      01/04/2024  Patient reports:   Pain: Yes: Occasionally. She has 1 tramadol tablet left.  She takes Tylenol as well as needed  Vaginal bleeding: No  Requiring narcotics: Yes: she has 1 tablet left  Fever/Chills: No  Nausea/Vomiting: No  Voiding: Yes  Constipation: Yes  Tolerating po: Yes  Ambulating: Yes  Back to daily activities: No  Vaginal bulge: No  Urinary incontinence: No    02/16/2024  Mild vaginal pain.  Denies discharge  or bleeding.   Bladder issues: rare UUI.  + nocturia 4/ night with urgency. Using 4 pads/ day with minimal wetness.   Bowel issues: intermittent constipation -- denies straining . Takes milk of magnesia 1-2 times/ week if needed    04/19/2024  Scant tan discharge. Has been using vaginal estrogen cream  Denies UI. Nocturia 2/ night. Wears 2 liner pads together-- changes twice daily--mostly dry  + constipation--did not start miralax    Changes since last visit:  Rare UUI if ignores urge  Not using estrogen cream  Intermittent constipation--taking miralax a few time weekly    Past Medical History:   Diagnosis Date    Essential (primary) hypertension     GERD without esophagitis     Hypercholesterolemia     Left-sided weakness     Schizoaffective disorder     Use of cane as ambulatory aid     Vaginal vault prolapse     Walker as ambulation aid        Past Surgical History:   Procedure Laterality Date    CATARACT EXTRACTION Bilateral     COLPORRHAPHY, COMBINED ANTEROPOSTERIOR N/A 12/21/2023    Procedure: COLPORRHAPHY, COMBINED  ANTEROPOSTERIOR;  Surgeon: Cordelia Gallardo MD;  Location: [facility] OR;  Service: OB/GYN;  Laterality: N/A;  A&P REPAIR    CYSTOSCOPY WITH INJECTION OF PERIURETHRAL BULKING AGENT N/A 12/21/2023    Procedure: CYSTOSCOPY, WITH PERIURETHRAL BULKING AGENT INJECTION;  Surgeon: Cordelia Gallardo MD;  Location: [facility];  Service: OB/GYN;  Laterality: N/A;  Bulkamid 2 boxes with Bulkamid scope //  EXAM UNDER ANESTHESIA // CYSTOSCOPY // PERINEOPLASTY //  CAPIO SLIM    SACROSPINOUS LIGAMENT FIXATION Bilateral 12/21/2023    Procedure: FIXATION, LIGAMENT, SACROSPINOUS  Bilateral;  Surgeon: Cordelia Gallardo MD;  Location: [facility] OR;  Service: OB/GYN;  Laterality: Bilateral;  Capio slim    THROMBECTOMY  2020    VAGINAL HYSTERECTOMY  1981       No family history on file.    Social History     Socioeconomic History    Marital status:    Tobacco Use    Smoking status: Never    Smokeless tobacco: Never   Substance and Sexual Activity    Alcohol use: Never    Drug use: Never    Sexual activity: Not Currently       Current Outpatient Medications   Medication Sig Dispense Refill    acetaminophen (TYLENOL) 500 MG tablet Take 1,000 mg by mouth as needed for Pain.      amLODIPine (NORVASC) 5 MG tablet Take 5 mg by mouth.      aspirin (ECOTRIN) 81 MG EC tablet Take 81 mg by mouth once daily.      atorvastatin calcium (ATORVASTATIN ORAL)   0 Refill(s), Start Date: 09/22/22 11:49:00 CDT      azelastine (ASTELIN) 137 mcg (0.1 %) nasal spray 1 spray by Nasal route.      ergocalciferol (ERGOCALCIFEROL) 50,000 unit Cap Take 50,000 Units by mouth.      famotidine (PEPCID) 40 MG tablet Take 40 mg by mouth every evening.      furosemide (LASIX) 20 MG tablet Take 20 mg by mouth.      haloperidoL (HALDOL) 5 MG tablet   0 Refill(s), Start Date: 09/22/22 11:51:00 CDT      levocetirizine (XYZAL) 5 MG tablet Take 5 mg by mouth.      meloxicam (MOBIC) 7.5 MG tablet   0 Refill(s), Start Date: 09/22/22 11:51:00 CDT      methocarbamoL (ROBAXIN) 750 MG Tab  "Take 750 mg by mouth every 8 (eight) hours.      metoprolol tartrate (LOPRESSOR) 50 MG tablet   0 Refill(s), Start Date: 09/22/22 11:50:00 CDT      montelukast (SINGULAIR) 10 mg tablet   0 Refill(s), Start Date: 09/22/22 11:51:00 CDT      polyethylene glycol (GLYCOLAX) 17 gram PwPk Take 17 g by mouth once daily. 30 packet 11    potassium chloride (KLOR-CON) 10 MEQ TbSR Take 10 mEq by mouth.      vibegron (GEMTESA) 75 mg Tab Take 75 mg by mouth once daily. 30 tablet 11    esomeprazole (NEXIUM) 40 MG capsule Take 40 mg by mouth. (Patient not taking: Reported on 10/28/2024)      estradioL (ESTRACE) 0.01 % (0.1 mg/gram) vaginal cream Place 1 g vaginally twice a week. Use a pea sized amount to the vagina once a night for 14 nights when initiating the medication, then 2-3 times per week.  DO NOT use the applicator (Patient not taking: Reported on 10/28/2024) 42.5 g 10    gabapentin (NEURONTIN) 300 MG capsule Take 1 capsule (300 mg total) by mouth once daily. (Patient not taking: Reported on 10/28/2024) 30 capsule 2    pantoprazole (PROTONIX) 40 MG tablet Take 40 mg by mouth every morning. (Patient not taking: Reported on 10/28/2024)      spironolactone (ALDACTONE) 50 MG tablet Take 50 mg by mouth.       No current facility-administered medications for this visit.       Review of patient's allergies indicates:   Allergen Reactions    Esomeprazole magnesium      Severe headache, pressure     Codeine Other (See Comments)     Headaches and Dizziness         ROS  Per HPI        Physical Exam  /76 (BP Location: Right arm)   Ht 5' 5" (1.651 m)   Wt 86.3 kg (190 lb 4.1 oz)   LMP  (LMP Unknown)   BMI 31.66 kg/m²   General: alert and oriented, no acute distress  Respiratory: normal respiratory effort  Abd: soft, non-tender, non-distended    Pelvic:  Ext. Genitalia: normal external genitalia. Normal bartholin's and skeens glands  Vagina: + atrophy. Normal vaginal mucosa without lesions. No discharge noted.   Non-tender " bladder base without palpable mass.  One small pds suture noted at R vaginal cuff, non tender.  Does not bother patient.   Cervix: absent  Uterus:  absent   Urethra: no masses or tenderness  Urethral meatus: no lesions, caruncle or prolapse.    POP-Q:  Aa 0; Ba 0; C -6; Ap -2; Bp -2; D n/a.  Genital hiatus 4, perineal body 2 total vaginal length 8.      Impression  1. Vaginal atrophy        2. Constipation, unspecified constipation type            We reviewed the above issues and discussed options for short-term versus long-term management of her problems.     Plan:   No lifting > 50 pounds  Use vaginal estrogen cream ----use dime size amount in vagina-- insert to your second knuckle and rub around just inside vaginal opening  twice weekly  Take miralax 1/2-1 capful daily--stop if stools are too loose--need to avoid straining  Patient will follow up with us in 6 months for follow up    I spent a total of 20 minutes on the day of the visit.  This includes face to face time and non-face to face time preparing to see the patient (eg, review of tests), obtaining and/or reviewing separately obtained history, documenting clinical information in the electronic or other health record, independently interpreting results and communicating results to the patient/family/caregiver, or care coordinator.     Nisa Meadows, SHAYLEE-BC  [facility]  Division of Female Pelvic Medicine and Reconstructive Surgery  Department of Obstetrics & Gynecology

## 2025-07-11 ENCOUNTER — OFFICE VISIT (OUTPATIENT)
Dept: UROGYNECOLOGY | Facility: CLINIC | Age: 70
End: 2025-07-11
Payer: MEDICARE

## 2025-07-11 VITALS — DIASTOLIC BLOOD PRESSURE: 68 MMHG | BODY MASS INDEX: 32.94 KG/M2 | WEIGHT: 198 LBS | SYSTOLIC BLOOD PRESSURE: 103 MMHG

## 2025-07-11 DIAGNOSIS — N81.11 MIDLINE CYSTOCELE: Primary | ICD-10-CM

## 2025-07-11 DIAGNOSIS — K59.00 CONSTIPATION, UNSPECIFIED CONSTIPATION TYPE: ICD-10-CM

## 2025-07-11 DIAGNOSIS — N81.9 VAGINAL VAULT PROLAPSE: ICD-10-CM

## 2025-07-11 DIAGNOSIS — N95.2 VAGINAL ATROPHY: ICD-10-CM

## 2025-07-11 PROCEDURE — 1101F PT FALLS ASSESS-DOCD LE1/YR: CPT | Mod: CPTII,S$GLB,, | Performed by: NURSE PRACTITIONER

## 2025-07-11 PROCEDURE — 3078F DIAST BP <80 MM HG: CPT | Mod: CPTII,S$GLB,, | Performed by: NURSE PRACTITIONER

## 2025-07-11 PROCEDURE — 99213 OFFICE O/P EST LOW 20 MIN: CPT | Mod: S$GLB,,, | Performed by: NURSE PRACTITIONER

## 2025-07-11 PROCEDURE — 1160F RVW MEDS BY RX/DR IN RCRD: CPT | Mod: CPTII,S$GLB,, | Performed by: NURSE PRACTITIONER

## 2025-07-11 PROCEDURE — 3008F BODY MASS INDEX DOCD: CPT | Mod: CPTII,S$GLB,, | Performed by: NURSE PRACTITIONER

## 2025-07-11 PROCEDURE — 3288F FALL RISK ASSESSMENT DOCD: CPT | Mod: CPTII,S$GLB,, | Performed by: NURSE PRACTITIONER

## 2025-07-11 PROCEDURE — 1159F MED LIST DOCD IN RCRD: CPT | Mod: CPTII,S$GLB,, | Performed by: NURSE PRACTITIONER

## 2025-07-11 PROCEDURE — 3074F SYST BP LT 130 MM HG: CPT | Mod: CPTII,S$GLB,, | Performed by: NURSE PRACTITIONER

## 2025-07-11 NOTE — PROGRESS NOTES
[facility]  [address], [address]  [place] [address]  July 13, 2025     Shavon Hahn  [id number]  1955    UROGYN POST-OP  7/13/2025     Shavon Hahn is a here for a urogyn follow up of vaginal atrophy s/p SSF    OPERATIVE NOTE  Date of Procedure: 12/21/2023      Procedure: Procedure(s) (LRB):  FIXATION, LIGAMENT, SACROSPINOUS  Bilateral (Bilateral)  CYSTOSCOPY  COLPORRHAPHY, COMBINED ANTEROPOSTERIOR (N/A)      01/04/2024  Patient reports:   Pain: Yes: Occasionally. She has 1 tramadol tablet left.  She takes Tylenol as well as needed  Vaginal bleeding: No  Requiring narcotics: Yes: she has 1 tablet left  Fever/Chills: No  Nausea/Vomiting: No  Voiding: Yes  Constipation: Yes  Tolerating po: Yes  Ambulating: Yes  Back to daily activities: No  Vaginal bulge: No  Urinary incontinence: No    02/16/2024  Mild vaginal pain.  Denies discharge  or bleeding.   Bladder issues: rare UUI.  + nocturia 4/ night with urgency. Using 4 pads/ day with minimal wetness.   Bowel issues: intermittent constipation -- denies straining . Takes milk of magnesia 1-2 times/ week if needed    04/19/2024  Scant tan discharge. Has been using vaginal estrogen cream  Denies UI. Nocturia 2/ night. Wears 2 liner pads together-- changes twice daily--mostly dry  + constipation--did not start miralax    10/28/2025  Rare UUI if ignores urge  Not using estrogen cream  Intermittent constipation--taking miralax a few time weekly    Changes since last visit:  Denies UI, urgency, or frequency  Using estrogen cream intermittently  Denies constipation or straining.  Taking miralax intermittently    Past Medical History:   Diagnosis Date    Essential (primary) hypertension     GERD without esophagitis     Hypercholesterolemia     Left-sided weakness     Schizoaffective disorder     Use of cane as ambulatory aid     Vaginal vault prolapse     Walker as ambulation aid        Past Surgical History:   Procedure  Laterality Date    CATARACT EXTRACTION Bilateral     COLPORRHAPHY, COMBINED ANTEROPOSTERIOR N/A 12/21/2023    Procedure: COLPORRHAPHY, COMBINED ANTEROPOSTERIOR;  Surgeon: Cordelia Gallardo MD;  Location: [facility];  Service: OB/GYN;  Laterality: N/A;  A&P REPAIR    CYSTOSCOPY WITH INJECTION OF PERIURETHRAL BULKING AGENT N/A 12/21/2023    Procedure: CYSTOSCOPY, WITH PERIURETHRAL BULKING AGENT INJECTION;  Surgeon: Cordelia Gallardo MD;  Location: [facility] OR;  Service: OB/GYN;  Laterality: N/A;  Bulkamid 2 boxes with Bulkamid scope //  EXAM UNDER ANESTHESIA // CYSTOSCOPY // PERINEOPLASTY //  CAPIO SLIM    SACROSPINOUS LIGAMENT FIXATION Bilateral 12/21/2023    Procedure: FIXATION, LIGAMENT, SACROSPINOUS  Bilateral;  Surgeon: Cordelia Gallardo MD;  Location: [facility];  Service: OB/GYN;  Laterality: Bilateral;  Capio slim    THROMBECTOMY  2020    VAGINAL HYSTERECTOMY  1981       No family history on file.    Social History     Socioeconomic History    Marital status:    Tobacco Use    Smoking status: Never    Smokeless tobacco: Never   Substance and Sexual Activity    Alcohol use: Never    Drug use: Never    Sexual activity: Not Currently     Social Drivers of Health     Food Insecurity: High Risk (11/3/2024)    Received from [facility] SDOH Screening     In the past 2 months, did you or others you live with eat smaller meals or skip meals because you didn't have money for food?: Yes   Housing Stability: High Risk (11/3/2024)    Received from [facility] SDOH Screening     In the past year, have you been unable to get any of the following when you really needed them? choose all that apply.: Utilities (electric, gas, and water)       Current Outpatient Medications   Medication Sig Dispense Refill    amLODIPine (NORVASC) 5 MG tablet Take 5 mg by mouth.      aspirin (ECOTRIN) 81 MG EC tablet Take 81 mg by mouth once daily.      atorvastatin calcium (ATORVASTATIN ORAL)   0 Refill(s), Start Date:  09/22/22 11:49:00 CDT      azelastine (ASTELIN) 137 mcg (0.1 %) nasal spray 1 spray by Nasal route.      ergocalciferol (ERGOCALCIFEROL) 50,000 unit Cap Take 50,000 Units by mouth.      estradioL (ESTRACE) 0.01 % (0.1 mg/gram) vaginal cream Place 1 g vaginally twice a week. Use a pea sized amount to the vagina once a night for 14 nights when initiating the medication, then 2-3 times per week.  DO NOT use the applicator 42.5 g 10    famotidine (PEPCID) 40 MG tablet Take 40 mg by mouth every evening.      furosemide (LASIX) 20 MG tablet Take 20 mg by mouth.      haloperidoL (HALDOL) 5 MG tablet   0 Refill(s), Start Date: 09/22/22 11:51:00 CDT      levocetirizine (XYZAL) 5 MG tablet Take 5 mg by mouth.      meloxicam (MOBIC) 7.5 MG tablet   0 Refill(s), Start Date: 09/22/22 11:51:00 CDT      methocarbamoL (ROBAXIN) 750 MG Tab Take 750 mg by mouth every 8 (eight) hours.      metoprolol tartrate (LOPRESSOR) 50 MG tablet   0 Refill(s), Start Date: 09/22/22 11:50:00 CDT      montelukast (SINGULAIR) 10 mg tablet   0 Refill(s), Start Date: 09/22/22 11:51:00 CDT      pantoprazole (PROTONIX) 40 MG tablet Take 40 mg by mouth every morning.      polyethylene glycol (GLYCOLAX) 17 gram PwPk Take 17 g by mouth once daily. 30 packet 11    potassium chloride (KLOR-CON) 10 MEQ TbSR Take 10 mEq by mouth.      spironolactone (ALDACTONE) 50 MG tablet Take 50 mg by mouth.      vibegron (GEMTESA) 75 mg Tab Take 75 mg by mouth once daily. 30 tablet 11    acetaminophen (TYLENOL) 500 MG tablet Take 1,000 mg by mouth as needed for Pain.      esomeprazole (NEXIUM) 40 MG capsule Take 40 mg by mouth. (Patient not taking: Reported on 7/11/2025)      gabapentin (NEURONTIN) 300 MG capsule Take 1 capsule (300 mg total) by mouth once daily. (Patient not taking: Reported on 10/28/2024) 30 capsule 2     No current facility-administered medications for this visit.       Review of patient's allergies indicates:   Allergen Reactions    Esomeprazole  magnesium      Severe headache, pressure     Codeine Other (See Comments)     Headaches and Dizziness         ROS  Per HPI        Physical Exam  /68 (Patient Position: Sitting)   Wt 89.8 kg (197 lb 15.6 oz)   LMP  (LMP Unknown)   BMI 32.94 kg/m²   General: alert and oriented, no acute distress  Respiratory: normal respiratory effort  Abd: soft, non-tender, non-distended    Pelvic:  Ext. Genitalia: normal external genitalia. Normal bartholin's and skeens glands  Vagina: + atrophy. Normal vaginal mucosa without lesions. No discharge noted.   Non-tender bladder base without palpable mass.    Cervix: absent  Uterus:  absent   Urethra: no masses or tenderness  Urethral meatus: no lesions, caruncle or prolapse.    POP-Q:  Aa 0; Ba +3; C -4; Ap -2; Bp -2; D n/a.  Genital hiatus 4, perineal body 2 total vaginal length 8.      Impression  1. Midline cystocele        2. Vaginal vault prolapse        3. Vaginal atrophy        4. Constipation, unspecified constipation type              We reviewed the above issues and discussed options for short-term versus long-term management of her problems.     Plan:   No lifting > 50 pounds  Use vaginal estrogen cream ----use dime size amount in vagina-- insert to your second knuckle and rub around just inside vaginal opening  twice weekly  Take miralax 1/2-1 capful daily--stop if stools are too loose--need to avoid straining  Patient will follow up in  October with Dr. Raphael    I spent a total of 20 minutes on the day of the visit.  This includes face to face time and non-face to face time preparing to see the patient (eg, review of tests), obtaining and/or reviewing separately obtained history, documenting clinical information in the electronic or other health record, independently interpreting results and communicating results to the patient/family/caregiver, or care coordinator.     Nisa Meadows, [facility]-BC [facility]  Division of Female Pelvic Medicine  and Reconstructive Surgery  Department of Obstetrics & Gynecology

## 2025-07-11 NOTE — PATIENT INSTRUCTIONS
No lifting > 50 pounds  Use vaginal estrogen cream ----use dime size amount in vagina-- insert to your second knuckle and rub around just inside vaginal opening  twice weekly  Take miralax 1/2-1 capful daily--stop if stools are too loose--need to avoid straining  Patient will follow up with us  October with Dr. Raphael

## (undated) DEVICE — NDL HYPO REG 25G X 1 1/2

## (undated) DEVICE — SOL IRRI STRL WATER 1000ML

## (undated) DEVICE — DISSECTOR SECTO CHERRY 3/8IN

## (undated) DEVICE — SUT CTD VICRYL 2.0

## (undated) DEVICE — GLOVE SENSICARE PI SLT 6.5

## (undated) DEVICE — GAUZE PACKING STRIP PLAIN 1X5

## (undated) DEVICE — ELECTRODE REM POLYHESIVE II

## (undated) DEVICE — SOL NACL IRR 1000ML BTL

## (undated) DEVICE — Device

## (undated) DEVICE — DRAPE UNDERBUTTOCKS PCH STRL

## (undated) DEVICE — CATH RED RUBBER LATEX 14F 16IN

## (undated) DEVICE — SET CYSTO IRR DRP CHMBR 84IN

## (undated) DEVICE — HOOK LONE STAR SHRP ELAS 5MM

## (undated) DEVICE — KIT SURGICAL TURNOVER

## (undated) DEVICE — GAUZE VISTEC XR DTECT 16 4X4IN

## (undated) DEVICE — GAUZE PACKING STRIP PLN 1/2X5

## (undated) DEVICE — POSITIONER HEAD ADULT

## (undated) DEVICE — IRRIGATION SET Y-TYPE TUR/BLAD

## (undated) DEVICE — KIT GYN MAJOR ABD LAFAYETTE

## (undated) DEVICE — SUT 2-0 VICRYL / SH (J417)

## (undated) DEVICE — SOL IRRIGATION WATER 3000ML

## (undated) DEVICE — SAFFRON FIXATION TOOL

## (undated) DEVICE — SUPPORT ULNA NERVE PROTECTOR

## (undated) DEVICE — TOWEL OR DISP STRL BLUE 4/PK

## (undated) DEVICE — ELECTRODE PATIENT RETURN DISP

## (undated) DEVICE — SUT ABSRB PDS II 2-0 SH 27IN

## (undated) DEVICE — ADAPTER STOPCOCK FEMALE LL

## (undated) DEVICE — GLOVE PROTEXIS PI SYN SURG 7

## (undated) DEVICE — BAG DRAIN UROLOGY AND HOSE

## (undated) DEVICE — SUT CTD VICRYL VIL BR CR/SH

## (undated) DEVICE — GOWN X-LG STERILE BACK

## (undated) DEVICE — PAD PINK TRENDELENBURG POS XL

## (undated) DEVICE — GLOVE PROTEXIS PI SYN SURG 6.5

## (undated) DEVICE — SUT PDS II 0 CT-1 VIL MONO

## (undated) DEVICE — SUT PDS PLUS 0 CT 36IN

## (undated) DEVICE — SYR 10CC LUER LOCK

## (undated) DEVICE — PACK PERI/GYN OPTIMA

## (undated) DEVICE — SUT VICRYL 3-0 27 SH

## (undated) DEVICE — TRAY CATH FOL SIL URIMTR 16FR

## (undated) DEVICE — KIT RETR PERI/GYN W/O STAYS

## (undated) DEVICE — DRAPE LEGGINGS CUFF 31X48IN

## (undated) DEVICE — SYR 50ML CATH TIP

## (undated) DEVICE — TRAY SKIN SCRUB WET PREMIUM